# Patient Record
Sex: MALE | Race: WHITE | Employment: FULL TIME | URBAN - METROPOLITAN AREA
[De-identification: names, ages, dates, MRNs, and addresses within clinical notes are randomized per-mention and may not be internally consistent; named-entity substitution may affect disease eponyms.]

---

## 2017-03-01 ENCOUNTER — TELEPHONE (OUTPATIENT)
Dept: FAMILY MEDICINE CLINIC | Facility: CLINIC | Age: 27
End: 2017-03-01

## 2017-03-01 NOTE — TELEPHONE ENCOUNTER
Patient notified that he will first need to see Dr Manny Meza, she can than give him a referral if necessary.  Patient will schedule a appointment

## 2017-03-09 ENCOUNTER — OFFICE VISIT (OUTPATIENT)
Dept: FAMILY MEDICINE CLINIC | Facility: CLINIC | Age: 27
End: 2017-03-09

## 2017-03-09 VITALS
HEIGHT: 76 IN | SYSTOLIC BLOOD PRESSURE: 118 MMHG | TEMPERATURE: 98 F | RESPIRATION RATE: 16 BRPM | HEART RATE: 82 BPM | WEIGHT: 181 LBS | BODY MASS INDEX: 22.04 KG/M2 | DIASTOLIC BLOOD PRESSURE: 76 MMHG

## 2017-03-09 DIAGNOSIS — S85.309S: ICD-10-CM

## 2017-03-09 DIAGNOSIS — I83.813 VARICOSE VEINS WITH PAIN, BILATERAL: Primary | ICD-10-CM

## 2017-03-09 PROCEDURE — 99213 OFFICE O/P EST LOW 20 MIN: CPT | Performed by: FAMILY MEDICINE

## 2017-03-09 NOTE — PATIENT INSTRUCTIONS
Olivia Dockery you were seen for painful legs due to extensive varicose veins and probable venous insufficiency. See Dr. Kaleb Cifuentes for opinion. Watch your stress. See me after the surgeon if needed.    Take care, Dr. Leif Pride    Leg Swelling in Both Legs    Swel · If your healthcare provider says that your leg swelling is caused by venous insufficiency or varicose veins, don't sit or  one place for long periods of time. Take breaks and walk about every few hours. Brisk walking is a good exercise.  It helps Varicose veins may or may not cause symptoms.  If symptoms do occur, they can include:  · Legs that feel tired, achy, heavy, or itchy  · Leg muscle cramps  · Skin changes, such as discoloration, dryness, redness, or rash (in more severe cases, you may also Follow up with your healthcare provider, or as directed. If imaging tests were done, you’ll be told the results and if there are any new findings that affect your care.   When to seek medical advice  Call your healthcare provider right away if any of these

## 2017-03-09 NOTE — PROGRESS NOTES
Fiona Schmidt is a 28year old male. HPI:   Pt here with vein concerns in his legs. States vein swelling in legs and pain in legs in left leg more than right leg.   He has had vein surgery on his left leg and feels like his legs are swelling again more la clear  NECK: supple,no adenopathy,no bruits  LUNGS: clear to auscultation  CARDIO: RRR without murmur  GI: good BS's,no masses, HSM or tenderness  EXTREMITIES: no cyanosis, clubbing; edema of both legs with left worse with large varicose vein traversing th

## 2017-03-13 ENCOUNTER — TELEPHONE (OUTPATIENT)
Dept: FAMILY MEDICINE CLINIC | Facility: CLINIC | Age: 27
End: 2017-03-13

## 2017-03-13 DIAGNOSIS — I83.813 VARICOSE VEINS OF BOTH LOWER EXTREMITIES WITH PAIN: Primary | ICD-10-CM

## 2017-03-13 NOTE — TELEPHONE ENCOUNTER
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 She would like a referral to him and not a general surgeon.

## 2017-03-15 ENCOUNTER — APPOINTMENT (OUTPATIENT)
Dept: CT IMAGING | Age: 27
End: 2017-03-15
Attending: EMERGENCY MEDICINE
Payer: OTHER MISCELLANEOUS

## 2017-03-15 ENCOUNTER — HOSPITAL ENCOUNTER (EMERGENCY)
Age: 27
Discharge: HOME | End: 2017-03-15
Attending: EMERGENCY MEDICINE
Payer: OTHER MISCELLANEOUS

## 2017-03-15 VITALS
DIASTOLIC BLOOD PRESSURE: 78 MMHG | TEMPERATURE: 97 F | OXYGEN SATURATION: 99 % | BODY MASS INDEX: 21.92 KG/M2 | WEIGHT: 180 LBS | RESPIRATION RATE: 16 BRPM | SYSTOLIC BLOOD PRESSURE: 133 MMHG | HEART RATE: 65 BPM | HEIGHT: 76 IN

## 2017-03-15 DIAGNOSIS — S00.03XA CONTUSION OF SCALP, INITIAL ENCOUNTER: ICD-10-CM

## 2017-03-15 DIAGNOSIS — S06.0X0A CONCUSSION WITH NO LOSS OF CONSCIOUSNESS, INITIAL ENCOUNTER: Primary | ICD-10-CM

## 2017-03-15 PROCEDURE — 99284 EMERGENCY DEPT VISIT MOD MDM: CPT

## 2017-03-15 PROCEDURE — 70450 CT HEAD/BRAIN W/O DYE: CPT

## 2017-03-15 RX ORDER — IBUPROFEN 600 MG/1
TABLET ORAL
Status: DISCONTINUED
Start: 2017-03-15 | End: 2017-03-15

## 2017-03-15 RX ORDER — IBUPROFEN 600 MG/1
600 TABLET ORAL ONCE
Status: COMPLETED | OUTPATIENT
Start: 2017-03-15 | End: 2017-03-15

## 2017-03-15 RX ORDER — ONDANSETRON 8 MG/1
8 TABLET, ORALLY DISINTEGRATING ORAL EVERY 6 HOURS PRN
Qty: 10 TABLET | Refills: 0 | Status: SHIPPED | OUTPATIENT
Start: 2017-03-15 | End: 2017-03-22

## 2017-03-15 NOTE — ED PROVIDER NOTES
Patient Seen in: [facility] Emergency Department In [place]    History   Patient presents with:  Head Neck Injury (neurologic, musculoskeletal)  Dizziness (neurologic)    Stated Complaint: HIT HEAD YESTERDAY WHILE AT WORK. NO LOC.   PT WITH CONTINUES HEAD Hypertension Maternal Grandfather    • Heart Disorder Paternal Grandmother    • Heart Disorder Paternal Grandfather          Smoking Status: Former Smoker                   Packs/Day: 0.00  Years:           Quit date: 11/05/2011    Smokeless Status: Never extremities with good strength and coordination. Gait is normal       ED Course   Labs Reviewed - No data to display    MDM   Patient has suffered a contusion to the head with postconcussive syndrome.       CT brain shows no acute intracranial abnormality

## 2017-03-16 ENCOUNTER — TELEPHONE (OUTPATIENT)
Dept: FAMILY MEDICINE CLINIC | Facility: CLINIC | Age: 27
End: 2017-03-16

## 2017-03-18 ENCOUNTER — OFFICE VISIT (OUTPATIENT)
Dept: FAMILY MEDICINE CLINIC | Facility: CLINIC | Age: 27
End: 2017-03-18

## 2017-03-18 VITALS
RESPIRATION RATE: 12 BRPM | OXYGEN SATURATION: 99 % | DIASTOLIC BLOOD PRESSURE: 70 MMHG | BODY MASS INDEX: 21.55 KG/M2 | WEIGHT: 177 LBS | SYSTOLIC BLOOD PRESSURE: 118 MMHG | HEIGHT: 76 IN | HEART RATE: 69 BPM | TEMPERATURE: 98 F

## 2017-03-18 DIAGNOSIS — S09.90XA HEAD INJURY, INITIAL ENCOUNTER: Primary | ICD-10-CM

## 2017-03-18 PROCEDURE — 99214 OFFICE O/P EST MOD 30 MIN: CPT | Performed by: FAMILY MEDICINE

## 2017-03-21 NOTE — PROGRESS NOTES
HPI:    Patient ID: Chris Whitley is a 28year old male. HPI  Patient is here for follow-up of ER visit for concussion. He was working and he bent forward at work and hit a metal object near the front and top of his head. No open wound.   He did feel hepatosplenomegaly, no abnormal aortic pulsation. MS:  No paraspinal or spinal tenderness, negative straight leg raise bilaterally, no joint swelling or tenderness, normal strength, range of motion and sensation in all four extremities.   Neurologic:  aler

## 2017-04-04 ENCOUNTER — TELEPHONE (OUTPATIENT)
Dept: FAMILY MEDICINE CLINIC | Facility: CLINIC | Age: 27
End: 2017-04-04

## 2017-04-04 DIAGNOSIS — I82.90 THROMBUS: Primary | ICD-10-CM

## 2017-04-04 NOTE — TELEPHONE ENCOUNTER
Dr. Van Lei called for pt --acute thrombus left leg found at past surgical site and he is starting him on Xarelto today at 10mg and he will need follow up.  RX added to med list today--Dr. Van Lei will send his reports to me as well-- Dr. Priscilla Perdue

## 2017-04-05 DIAGNOSIS — I82.90 THROMBUS: Primary | ICD-10-CM

## 2017-04-05 NOTE — TELEPHONE ENCOUNTER
Luis De La Torre said when she spoke to Dr Isael Valenzuela yesterday she told lilia the concussion was over 4 weeks ago and he said that should be OK to start the [address] [place] [address].  When she got home she relixed it had only been 3 weeks and she wants to make sure it is OK that he start the

## 2017-04-12 ENCOUNTER — HOSPITAL ENCOUNTER (EMERGENCY)
Age: 27
Discharge: HOME | End: 2017-04-12
Attending: EMERGENCY MEDICINE
Payer: COMMERCIAL

## 2017-04-12 ENCOUNTER — APPOINTMENT (OUTPATIENT)
Dept: CT IMAGING | Age: 27
End: 2017-04-12
Attending: EMERGENCY MEDICINE
Payer: COMMERCIAL

## 2017-04-12 VITALS
HEART RATE: 70 BPM | SYSTOLIC BLOOD PRESSURE: 130 MMHG | WEIGHT: 170 LBS | RESPIRATION RATE: 18 BRPM | TEMPERATURE: 98 F | OXYGEN SATURATION: 99 % | BODY MASS INDEX: 21 KG/M2 | DIASTOLIC BLOOD PRESSURE: 80 MMHG

## 2017-04-12 DIAGNOSIS — R91.1 PULMONARY NODULE: ICD-10-CM

## 2017-04-12 DIAGNOSIS — R07.89 CHEST PAIN, NON-CARDIAC: Primary | ICD-10-CM

## 2017-04-12 PROCEDURE — 99285 EMERGENCY DEPT VISIT HI MDM: CPT

## 2017-04-12 PROCEDURE — 36415 COLL VENOUS BLD VENIPUNCTURE: CPT

## 2017-04-12 PROCEDURE — 71275 CT ANGIOGRAPHY CHEST: CPT

## 2017-04-12 PROCEDURE — 80053 COMPREHEN METABOLIC PANEL: CPT | Performed by: EMERGENCY MEDICINE

## 2017-04-12 PROCEDURE — 85610 PROTHROMBIN TIME: CPT | Performed by: EMERGENCY MEDICINE

## 2017-04-12 PROCEDURE — 85730 THROMBOPLASTIN TIME PARTIAL: CPT | Performed by: EMERGENCY MEDICINE

## 2017-04-12 PROCEDURE — 84484 ASSAY OF TROPONIN QUANT: CPT | Performed by: EMERGENCY MEDICINE

## 2017-04-12 PROCEDURE — 93005 ELECTROCARDIOGRAM TRACING: CPT

## 2017-04-12 PROCEDURE — 85025 COMPLETE CBC W/AUTO DIFF WBC: CPT | Performed by: EMERGENCY MEDICINE

## 2017-04-12 PROCEDURE — 93010 ELECTROCARDIOGRAM REPORT: CPT

## 2017-04-12 PROCEDURE — 81003 URINALYSIS AUTO W/O SCOPE: CPT | Performed by: EMERGENCY MEDICINE

## 2017-04-12 NOTE — ED PROVIDER NOTES
Patient Seen in: [facility] Emergency Department In [place]    History   Patient presents with:  Chest Pain Angina (cardiovascular)    Stated Complaint: chest pain/leg pain/confirmed DVT    HPI    The patient is a 23-year-old male who is diagnosed about 1 Smoking Status: Former Smoker                   Packs/Day: 0.00  Years:           Quit date: 11/05/2011    Smokeless Status: Never Used                        Alcohol Use: Yes                Comment: 1-2 drinks/week      Review of Systems    Positi Course     Labs Reviewed   PROTHROMBIN TIME (PT) - Abnormal; Notable for the following:     PT 14.8 (*)     INR 1.19 (*)     All other components within normal limits   URINALYSIS WITH CULTURE REFLEX - Abnormal; Notable for the following:     Ketones Urine techniques were used. Dose information is transmitted to the ACR [employer] of Radiology) NRDR ([address]) which includes the Dose Index Registry.   PATIENT STATED HISTORY:(As transcribed by Technologist)  Patient complain of s pulmonary nodule. He will also follow up with his vascular surgeon as an outpatient. He felt comfortable with this plan and he was discharged home in stable condition.       Disposition and Plan     Clinical Impression:  Chest pain, non-cardiac  (primary

## 2017-04-12 NOTE — ED INITIAL ASSESSMENT (HPI)
Sob that started while he was at works states that he had to sit down and became dizzy.  Has confirmed blood clot in left leg and is being treated on xarelto

## 2017-04-13 ENCOUNTER — TELEPHONE (OUTPATIENT)
Dept: FAMILY MEDICINE CLINIC | Facility: CLINIC | Age: 27
End: 2017-04-13

## 2017-05-05 ENCOUNTER — TELEPHONE (OUTPATIENT)
Dept: FAMILY MEDICINE CLINIC | Facility: CLINIC | Age: 27
End: 2017-05-05

## 2017-05-05 NOTE — TELEPHONE ENCOUNTER
Received above message from patients mother. I looked at Dr Polina Platt note from yesterday and he ordered labs and I believe he wants patient to get labs done and than to follow up with you.

## 2017-05-05 NOTE — TELEPHONE ENCOUNTER
Please call pt directly --he is 28 yr old. Mom does not need to be involved unless requested by pt. Advise pt labs were ordered and I will not be ordering additional labs. Pt still needs OV with me as well. Call pt not his mom.  Dr. Devin Stratton

## 2017-05-06 NOTE — TELEPHONE ENCOUNTER
Left message on pt's vm asking him to [facility] regarding labs.   Await CB

## 2017-05-08 ENCOUNTER — MYAURORA ACCOUNT LINK (OUTPATIENT)
Dept: OTHER | Age: 27
End: 2017-05-08

## 2017-05-08 ENCOUNTER — HOSPITAL ENCOUNTER (OUTPATIENT)
Dept: CV DIAGNOSTICS | Age: 27
Discharge: HOME | End: 2017-05-08
Attending: SURGERY
Payer: COMMERCIAL

## 2017-05-08 ENCOUNTER — APPOINTMENT (OUTPATIENT)
Dept: LAB | Age: 27
End: 2017-05-08
Attending: SURGERY
Payer: COMMERCIAL

## 2017-05-08 DIAGNOSIS — R01.1 CARDIAC MURMUR: ICD-10-CM

## 2017-05-08 DIAGNOSIS — I80.202 DEEP VEIN THROMBOPHLEBITIS OF LEFT LEG (HCC): ICD-10-CM

## 2017-05-08 PROCEDURE — 85307 ASSAY ACTIVATED PROTEIN C: CPT

## 2017-05-08 PROCEDURE — 85613 RUSSELL VIPER VENOM DILUTED: CPT

## 2017-05-08 PROCEDURE — 36415 COLL VENOUS BLD VENIPUNCTURE: CPT

## 2017-05-08 PROCEDURE — 81241 F5 GENE: CPT

## 2017-05-08 PROCEDURE — 85306 CLOT INHIBIT PROT S FREE: CPT

## 2017-05-08 PROCEDURE — 86147 CARDIOLIPIN ANTIBODY EA IG: CPT

## 2017-05-08 PROCEDURE — 85705 THROMBOPLASTIN INHIBITION: CPT

## 2017-05-08 PROCEDURE — 85670 THROMBIN TIME PLASMA: CPT

## 2017-05-08 PROCEDURE — 85732 THROMBOPLASTIN TIME PARTIAL: CPT

## 2017-05-08 NOTE — TELEPHONE ENCOUNTER
I spoke to patient and told him that Dr Chele Willis has ordered the labs and he needs to schedule a appointment with Dr Kizzy Penaloza. Patient transferred to  to schedule appointment.

## 2017-05-15 ENCOUNTER — OFFICE VISIT (OUTPATIENT)
Dept: FAMILY MEDICINE CLINIC | Facility: CLINIC | Age: 27
End: 2017-05-15

## 2017-05-15 VITALS
HEIGHT: 74 IN | RESPIRATION RATE: 16 BRPM | SYSTOLIC BLOOD PRESSURE: 122 MMHG | DIASTOLIC BLOOD PRESSURE: 78 MMHG | WEIGHT: 179 LBS | BODY MASS INDEX: 22.97 KG/M2 | HEART RATE: 86 BPM | TEMPERATURE: 98 F

## 2017-05-15 DIAGNOSIS — I83.90 VARICOSE VEIN OF LEG: ICD-10-CM

## 2017-05-15 DIAGNOSIS — I34.1 MITRAL VALVE PROLAPSE: ICD-10-CM

## 2017-05-15 DIAGNOSIS — I82.492 ACUTE DEEP VEIN THROMBOSIS (DVT) OF OTHER SPECIFIED VEIN OF LEFT LOWER EXTREMITY (HCC): Primary | ICD-10-CM

## 2017-05-15 DIAGNOSIS — K64.8 INTERNAL HEMORRHOID: ICD-10-CM

## 2017-05-15 DIAGNOSIS — Z79.01 ON CONTINUOUS ORAL ANTICOAGULATION: ICD-10-CM

## 2017-05-15 DIAGNOSIS — S85.302S: ICD-10-CM

## 2017-05-15 DIAGNOSIS — R91.1 PULMONARY NODULE: ICD-10-CM

## 2017-05-15 DIAGNOSIS — D69.6 THROMBOCYTOPENIA, UNSPECIFIED (HCC): ICD-10-CM

## 2017-05-15 PROCEDURE — 99214 OFFICE O/P EST MOD 30 MIN: CPT | Performed by: FAMILY MEDICINE

## 2017-05-15 NOTE — PROGRESS NOTES
Ruba Rodriguez is a 28year old male. HPI:   Pt referred to Dr. Xochitl Snow for vein concerns and was seen on 4/4/17 and diagnosed with a left lower leg DVT and placed on Xarelto BID.  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 Joon Rodríguez HPI notes above for further details  SKIN: denies any unusual skin lesions or rashes  RESPIRATORY: denies shortness of breath with exertion currently but was in ER hemorrhoids internal evaluation and banding if appropriate. He will return to see Dr. Ami Ruffin as well as scheduled. He will also see Dr. Juventino Olivo for his platelet issues.  He is due to repeat another CT chest to assess his pulmonary nodule and this was ordered f

## 2017-05-15 NOTE — PATIENT INSTRUCTIONS
Rebekah Sanchez you were seen for recently diagnosed DVT and review of recent labs and test results. Your labs are all normal. See  for cardiology overview. See Dr. Jeanne Barthel for the hemorrhoids internal evaluation. See Dr. Ríos Likes as well as scheduled.    Ta · You were likely prescribed blood thinners (anticoagulants). They may be given as pills (oral) or shots (injections). Follow all instructions when using these medicines.  Note: Do not take blood thinners with other medicines, herbal remedies, or supplement The term venous thromboembolism (VTE) is used to describe two conditions, deep vein thrombosis (DVT) and pulmonary embolism (PE). Homa Pelayo use the term VTE because the two conditions are very closely related, and because their prevention and treatment are clos If you are prescribed warfarin, it is important that you have regular blood tests as prescribed. Make sure you keep all appointments for lab tests, so that your healthcare provider knows whether to adjust your dosage.  If you don't, you may not be getting t · Coughing up blood  · Nose bleeds  · Bleeding gums  · Bleeding from a cut that will not stop  · Vaginal bleeding  Call 911  Call 911 if you have the following symptoms. They may mean a blood clot in the lungs.   · Chest pain  · Trouble breathing  · Fast he

## 2017-05-18 ENCOUNTER — PRIOR ORIGINAL RECORDS (OUTPATIENT)
Dept: OTHER | Age: 27
End: 2017-05-18

## 2017-05-31 NOTE — TELEPHONE ENCOUNTER
Patient is ready to see a psychiatrist, he has IHP, no referral is needed, he just calls the number on the back oh his insurance card. Unless you want him to go through [facility]?

## 2017-05-31 NOTE — TELEPHONE ENCOUNTER
I spoke to patient and told him to be expecting a call from [facility] in the next 24-48 hours.  He verbalizes understanding

## 2017-06-01 ENCOUNTER — TELEPHONE (OUTPATIENT)
Dept: FAMILY MEDICINE CLINIC | Facility: CLINIC | Age: 27
End: 2017-06-01

## 2017-06-07 ENCOUNTER — TELEPHONE (OUTPATIENT)
Dept: FAMILY MEDICINE CLINIC | Facility: CLINIC | Age: 27
End: 2017-06-07

## 2017-06-07 NOTE — TELEPHONE ENCOUNTER
Pt states that he saw Frieda Stewart, the counselor which wanted to put him on a medication that will conflict with a medication he is already on.

## 2017-06-08 ENCOUNTER — TELEPHONE (OUTPATIENT)
Dept: FAMILY MEDICINE CLINIC | Facility: CLINIC | Age: 27
End: 2017-06-08

## 2017-06-08 NOTE — TELEPHONE ENCOUNTER
Patients Mom called yesterday right before we closed, I spoke to her now, she is calling about [employer] and his depression/anxiety medication.  She said he has not been on the Lexapro in over 2 years, the pharmacist told him he cannot take this medication while

## 2017-06-12 PROBLEM — I82.419 DVT OF DEEP FEMORAL VEIN (HCC): Status: ACTIVE | Noted: 2017-06-12

## 2017-06-12 NOTE — PATIENT INSTRUCTIONS
Bam Han you were seen for management of anxiety--use the Xanax as needed up to twice a day with care and caution. See Frieda Stewart for counseling and help. Keep seeing Dr. Mitul Gomes and have him fill out your disability papers. Stay on the [address] [place] [address].   He will also ord · Notice how your body reacts to stress. Learn to listen to your body signals. This will help you take action before the stress becomes severe. · When you can, do something about the source of your stress.  (Avoid hassles, limit the amount of change that h Normal anxiety is part of the body’s natural defense system.  It's an alert to a threat that is unknown, vague, or comes from your own internal fears. While you’re in this state, your feelings can range from a vague sense of worry to physical sensations suc Anxiety can be treated  The good news is that the anxiety that’s disrupting your life can be treated. Working with your doctor or other healthcare provider, you can develop skills to help you cope with anxiety.  You can also gain the perspective you need to

## 2017-08-31 ENCOUNTER — TELEPHONE (OUTPATIENT)
Dept: FAMILY MEDICINE CLINIC | Facility: CLINIC | Age: 27
End: 2017-08-31

## 2017-08-31 ENCOUNTER — OFFICE VISIT (OUTPATIENT)
Dept: FAMILY MEDICINE CLINIC | Facility: CLINIC | Age: 27
End: 2017-08-31

## 2017-08-31 VITALS
BODY MASS INDEX: 21.55 KG/M2 | OXYGEN SATURATION: 99 % | SYSTOLIC BLOOD PRESSURE: 118 MMHG | DIASTOLIC BLOOD PRESSURE: 76 MMHG | RESPIRATION RATE: 16 BRPM | WEIGHT: 177 LBS | HEART RATE: 82 BPM | HEIGHT: 76 IN

## 2017-08-31 DIAGNOSIS — S39.011A ABDOMINAL WALL STRAIN, INITIAL ENCOUNTER: ICD-10-CM

## 2017-08-31 DIAGNOSIS — M62.838 SPASM OF ABDOMINAL MUSCLES OF LEFT SIDE: ICD-10-CM

## 2017-08-31 DIAGNOSIS — R10.32 ABDOMINAL WALL PAIN IN LEFT LOWER QUADRANT: Primary | ICD-10-CM

## 2017-08-31 PROCEDURE — 99213 OFFICE O/P EST LOW 20 MIN: CPT | Performed by: FAMILY MEDICINE

## 2017-08-31 RX ORDER — METAXALONE 800 MG/1
TABLET ORAL
Qty: 30 TABLET | Refills: 0 | Status: SHIPPED | OUTPATIENT
Start: 2017-08-31 | End: 2017-10-30

## 2017-08-31 NOTE — PROGRESS NOTES
Gaurang Pina is a 28year old male. HPI:   Pt called to be seen a few hours ago with new left lower abdominal wall pains getting worse. He has had chronic DVT and was just cleared this past week by Dr. Heather Gomez to start exercising again.   He states a few d on exertion  GI: over did it he states with exercise and now lower mainly left sided abdominal wall muscle pain and denies heartburn; no nausea or vomiting or diarrhea; no blood in urine or stool; no trauma to abdominal wall  NEURO: denies headaches  HEME: encounter  -     OP REFERRAL TO [facility]  -     Metaxalone 800 MG Oral Tab; Take one half to a full tab nightly PRN    Spasm of abdominal muscles of left side  -     Metaxalone 800 MG Oral Tab;  Take one half to a full tab nightly PRN

## 2017-08-31 NOTE — TELEPHONE ENCOUNTER
Pt heard and felt a popping sensation in his abd while working out he states the area is burning, hard and swelling.  Pt goes to work at [employer], he would like to be seen this am

## 2017-08-31 NOTE — PATIENT INSTRUCTIONS
Abdominal Pain    Abdominal pain is pain in the stomach or belly area. Everyone has this pain from time to time. In many cases it goes away on its own. But abdominal pain can sometimes be due to a serious problem, such as appendicitis.  So it’s important If you have vomiting or diarrhea, sip water or other clear fluids. When you are ready to eat solid foods again, start with small amounts of easy-to-digest, low-fat foods. These include apple sauce, toast, or crackers.    When to seek medical care  Call [phone] A muscle spasm is a sudden tightening of the muscle you can’t control. This may be caused by strain, overworking the muscle, or injury. It can also be caused by dehydration, electrolyte imbalance, diabetes, alcohol use, and certain medicines.  If it goes on

## 2017-09-11 ENCOUNTER — OFFICE VISIT (OUTPATIENT)
Dept: PHYSICAL THERAPY | Age: 27
End: 2017-09-11
Attending: NURSE PRACTITIONER
Payer: COMMERCIAL

## 2017-09-11 DIAGNOSIS — R10.32 ABDOMINAL WALL PAIN IN LEFT LOWER QUADRANT: ICD-10-CM

## 2017-09-11 DIAGNOSIS — S39.011A ABDOMINAL WALL STRAIN, INITIAL ENCOUNTER: ICD-10-CM

## 2017-09-11 PROCEDURE — 97530 THERAPEUTIC ACTIVITIES: CPT | Performed by: PHYSICAL THERAPIST

## 2017-09-11 PROCEDURE — 97161 PT EVAL LOW COMPLEX 20 MIN: CPT | Performed by: PHYSICAL THERAPIST

## 2017-09-11 NOTE — PROGRESS NOTES
SPINE EVALUATION:   Referring Physician: Dr. Jarrett Pearce  Diagnosis: Abdominal wall strain left      Date of Service: 9/11/2017     PATIENT Anjana Garza is a 28year old y/o male who presents to therapy today with complaints of left side abdomin lower tibia  Extension: WNL  Sidebending: R WNL; L WNL  Mild left abdominal pain with right SB   Rotation: R WNL; L WNL    Accessory motion: Good mobility lumbar spine.  Mild left side lumbar pain with unilateral PAS lower lumbar spine   Palpation: No palpa in 4 weeks   3) The patient will be able to perform ADLS without difficulty in 4 weeks   4) FOTO of 79/100 in 4 weeks     Frequency / Duration: Patient will be seen for 2 x/week or a total of 8 visits over a 90 day period.  Treatment will include: Manual Th

## 2017-09-13 ENCOUNTER — OFFICE VISIT (OUTPATIENT)
Dept: PHYSICAL THERAPY | Age: 27
End: 2017-09-13
Attending: NURSE PRACTITIONER
Payer: COMMERCIAL

## 2017-09-13 PROCEDURE — 97112 NEUROMUSCULAR REEDUCATION: CPT | Performed by: PHYSICAL THERAPIST

## 2017-09-13 PROCEDURE — 97110 THERAPEUTIC EXERCISES: CPT | Performed by: PHYSICAL THERAPIST

## 2017-09-13 NOTE — PROGRESS NOTES
Dx: Abdominal pain left lower quadrant, strain         Authorized # of Visits:  8         Next MD visit: none scheduled  Fall Risk: standard         Precautions: n/a             Subjective: The patient states he is feeling better.      Objective: Continued

## 2017-09-14 ENCOUNTER — APPOINTMENT (OUTPATIENT)
Dept: PHYSICAL THERAPY | Age: 27
End: 2017-09-14
Attending: NURSE PRACTITIONER
Payer: COMMERCIAL

## 2017-09-19 ENCOUNTER — OFFICE VISIT (OUTPATIENT)
Dept: PHYSICAL THERAPY | Age: 27
End: 2017-09-19
Attending: NURSE PRACTITIONER
Payer: COMMERCIAL

## 2017-09-19 PROCEDURE — 97112 NEUROMUSCULAR REEDUCATION: CPT | Performed by: PHYSICAL THERAPIST

## 2017-09-19 PROCEDURE — 97110 THERAPEUTIC EXERCISES: CPT | Performed by: PHYSICAL THERAPIST

## 2017-09-19 NOTE — PROGRESS NOTES
Dx: Abdominal pain left lower quadrant, strain         Authorized # of Visits:  8         Next MD visit: none scheduled  Fall Risk: standard         Precautions: n/a             Subjective: The patient states the abdominals continue to get better.  He does Quadruped rock back  High knees with same side reach 10 feet x 1  each         Prone hip extension 15 reps each  Bridge 10 reps          Seated on SB anterior pelvic tilt             Charges:  TherEx (1), Neuro Re-ed (2)       Total Timed Treatment: 45 mi

## 2017-09-21 ENCOUNTER — OFFICE VISIT (OUTPATIENT)
Dept: PHYSICAL THERAPY | Age: 27
End: 2017-09-21
Attending: NURSE PRACTITIONER
Payer: COMMERCIAL

## 2017-09-21 PROCEDURE — 97112 NEUROMUSCULAR REEDUCATION: CPT | Performed by: PHYSICAL THERAPIST

## 2017-09-21 PROCEDURE — 97110 THERAPEUTIC EXERCISES: CPT | Performed by: PHYSICAL THERAPIST

## 2017-09-21 NOTE — PROGRESS NOTES
Dx: Abdominal pain left lower quadrant, strain         Authorized # of Visits:  8         Next MD visit: none scheduled  Fall Risk: standard         Precautions: n/a             Subjective: No pain today. Objective:  Made additions to HEP of single leg Single leg balance right and left LE x 3 10 seconds max  (HEP)       Quadruped rock back  High knees with same side reach 10 feet x 1  each  Seated HS with DF/PF 5 reps, 3 reps total (HEP)       Prone hip extension 15 reps each  Bridge 10 reps  Gastroc/sol

## 2017-09-25 ENCOUNTER — APPOINTMENT (OUTPATIENT)
Dept: PHYSICAL THERAPY | Age: 27
End: 2017-09-25
Attending: NURSE PRACTITIONER
Payer: COMMERCIAL

## 2017-09-28 ENCOUNTER — TELEPHONE (OUTPATIENT)
Dept: FAMILY MEDICINE CLINIC | Facility: CLINIC | Age: 27
End: 2017-09-28

## 2017-09-28 ENCOUNTER — OFFICE VISIT (OUTPATIENT)
Dept: PHYSICAL THERAPY | Age: 27
End: 2017-09-28
Attending: NURSE PRACTITIONER
Payer: COMMERCIAL

## 2017-09-28 PROCEDURE — 97140 MANUAL THERAPY 1/> REGIONS: CPT | Performed by: PHYSICAL THERAPIST

## 2017-09-28 PROCEDURE — 97110 THERAPEUTIC EXERCISES: CPT | Performed by: PHYSICAL THERAPIST

## 2017-09-28 NOTE — PROGRESS NOTES
Dx: Abdominal pain left lower quadrant, strain         Authorized # of Visits:  8         Next MD visit: none scheduled  Fall Risk: standard         Precautions: n/a             Subjective:  The patient states that he strained his abdominals a little bit la kneel hip flexor stretch right and left       1/2 kneel  Plank on hands x 4 parallel hand x 2, offset right and left x 1  Quadruped rock back tactile cues through hips  1/2 kneel transverse plane isometrics 10 each      Hip/HS mobility on step 20 each  Pro

## 2017-09-28 NOTE — TELEPHONE ENCOUNTER
Patient states that he has been going through physical therapy. He states that he does have pain in the groin/testicle area and feels as if there is a lump in the groin. Patient would like an order for CT/US to rule out a possible hernia.

## 2017-09-29 NOTE — TELEPHONE ENCOUNTER
Please call pt and let him know that  wont be back until weds and he can wait until she is back in the office or he can schedule an appt with  to get an order for CT/US

## 2017-10-02 ENCOUNTER — OFFICE VISIT (OUTPATIENT)
Dept: FAMILY MEDICINE CLINIC | Facility: CLINIC | Age: 27
End: 2017-10-02

## 2017-10-02 ENCOUNTER — APPOINTMENT (OUTPATIENT)
Dept: PHYSICAL THERAPY | Age: 27
End: 2017-10-02
Attending: NURSE PRACTITIONER
Payer: COMMERCIAL

## 2017-10-02 VITALS
BODY MASS INDEX: 21.27 KG/M2 | RESPIRATION RATE: 16 BRPM | HEART RATE: 72 BPM | WEIGHT: 174.63 LBS | TEMPERATURE: 98 F | HEIGHT: 76 IN | DIASTOLIC BLOOD PRESSURE: 70 MMHG | SYSTOLIC BLOOD PRESSURE: 110 MMHG

## 2017-10-02 DIAGNOSIS — N50.812 TESTICULAR PAIN, LEFT: ICD-10-CM

## 2017-10-02 DIAGNOSIS — R10.32 LEFT LOWER QUADRANT PAIN: Primary | ICD-10-CM

## 2017-10-02 PROCEDURE — 99214 OFFICE O/P EST MOD 30 MIN: CPT | Performed by: FAMILY MEDICINE

## 2017-10-02 NOTE — PROGRESS NOTES
HPI:    Patient ID: Sallyanne Soulier is a 28year old male. HPI  Patient states of having pain in the left lower quadrant abdominal wall and left groin and then now on left testicle area. No penile discharge. No pain with urination.   He does  do exercis quadrant pain  (primary encounter diagnosis)  Testicular pain, left  There is suspicion of hernia in the left inguinal region. Will check ultrasound and also of scrotal area due to testicular pain. Will call results.   No orders of the defined types were

## 2017-10-03 ENCOUNTER — HOSPITAL ENCOUNTER (OUTPATIENT)
Dept: ULTRASOUND IMAGING | Age: 27
Discharge: HOME | End: 2017-10-03
Attending: FAMILY MEDICINE
Payer: COMMERCIAL

## 2017-10-03 DIAGNOSIS — R10.32 LEFT LOWER QUADRANT PAIN: ICD-10-CM

## 2017-10-03 DIAGNOSIS — N50.812 TESTICULAR PAIN, LEFT: ICD-10-CM

## 2017-10-03 PROCEDURE — 76705 ECHO EXAM OF ABDOMEN: CPT | Performed by: FAMILY MEDICINE

## 2017-10-03 PROCEDURE — 76870 US EXAM SCROTUM: CPT | Performed by: FAMILY MEDICINE

## 2017-10-03 PROCEDURE — 93975 VASCULAR STUDY: CPT | Performed by: FAMILY MEDICINE

## 2017-10-04 ENCOUNTER — TELEPHONE (OUTPATIENT)
Dept: FAMILY MEDICINE CLINIC | Facility: CLINIC | Age: 27
End: 2017-10-04

## 2017-10-05 ENCOUNTER — APPOINTMENT (OUTPATIENT)
Dept: PHYSICAL THERAPY | Age: 27
End: 2017-10-05
Attending: NURSE PRACTITIONER
Payer: COMMERCIAL

## 2017-10-05 NOTE — TELEPHONE ENCOUNTER
For ultrasound of scrotum conclusion says moderate left varicocele but not much mentioned in the description. Could radiology make an addendum for that. Please check with radiology.

## 2017-10-09 ENCOUNTER — TELEPHONE (OUTPATIENT)
Dept: FAMILY MEDICINE CLINIC | Facility: CLINIC | Age: 27
End: 2017-10-09

## 2017-10-09 DIAGNOSIS — I86.1 VARICOCELE: Primary | ICD-10-CM

## 2017-10-09 NOTE — TELEPHONE ENCOUNTER
Patient was notified a referral has been placed to see urologist. Patient verbalized understanding. Patient mother was also called and notified order has been placed. She verbalized understanding.

## 2017-10-09 NOTE — TELEPHONE ENCOUNTER
Patient was notified of test results as well as mother, they both verbalized understanding, info give for [facility] urologist for Dr. Lindsay Lyles since they wanted a doctor close to home.

## 2017-10-30 ENCOUNTER — TELEPHONE (OUTPATIENT)
Dept: FAMILY MEDICINE CLINIC | Facility: CLINIC | Age: 27
End: 2017-10-30

## 2017-10-30 DIAGNOSIS — S85.302S: Primary | ICD-10-CM

## 2017-10-30 DIAGNOSIS — Z79.01 ON CONTINUOUS ORAL ANTICOAGULATION: ICD-10-CM

## 2017-10-30 DIAGNOSIS — I82.412 DEEP VENOUS THROMBOSIS OF LEFT PROFUNDA FEMORIS VEIN (HCC): ICD-10-CM

## 2017-10-30 NOTE — TELEPHONE ENCOUNTER
Pt is requesting  to start filling xarelto. Dr. Yimi Munroe will no longer be refilling that specific medication.   Med pended and routed to PCP

## 2017-10-30 NOTE — TELEPHONE ENCOUNTER
Pharmacy calling in regards to script for Xarelto 20mg. Dr. Arie Reyes, surgeon, was physician who previously prescribed this medication. He will not prescribe this for patient any longer.  Pharmacy calling to see if Dr. Agustín Fisher will now prescribe the medication

## 2017-11-08 ENCOUNTER — OFFICE VISIT (OUTPATIENT)
Dept: FAMILY MEDICINE CLINIC | Facility: CLINIC | Age: 27
End: 2017-11-08

## 2017-11-08 VITALS
TEMPERATURE: 98 F | HEIGHT: 76 IN | WEIGHT: 177.19 LBS | HEART RATE: 66 BPM | SYSTOLIC BLOOD PRESSURE: 118 MMHG | BODY MASS INDEX: 21.58 KG/M2 | DIASTOLIC BLOOD PRESSURE: 76 MMHG | RESPIRATION RATE: 16 BRPM

## 2017-11-08 DIAGNOSIS — J02.9 SORE THROAT: Primary | ICD-10-CM

## 2017-11-08 DIAGNOSIS — J11.1 INFLUENZA-LIKE ILLNESS: ICD-10-CM

## 2017-11-08 PROCEDURE — 87798 DETECT AGENT NOS DNA AMP: CPT | Performed by: FAMILY MEDICINE

## 2017-11-08 PROCEDURE — 87502 INFLUENZA DNA AMP PROBE: CPT | Performed by: FAMILY MEDICINE

## 2017-11-08 PROCEDURE — 87880 STREP A ASSAY W/OPTIC: CPT | Performed by: FAMILY MEDICINE

## 2017-11-08 PROCEDURE — 99214 OFFICE O/P EST MOD 30 MIN: CPT | Performed by: FAMILY MEDICINE

## 2017-11-08 RX ORDER — BENZONATATE 100 MG/1
100 CAPSULE ORAL 3 TIMES DAILY PRN
Qty: 20 CAPSULE | Refills: 0 | Status: SHIPPED | OUTPATIENT
Start: 2017-11-08 | End: 2017-11-15

## 2017-11-08 NOTE — PROGRESS NOTES
HPI:   Chris Whitley is a 28year old male that presents for influenza like symptoms for 5 days. He had body aches, chills, and cough starting last week. No has runny nose, sore throat and sinus congestion. He has not taken his temperature.  He denies r care  - INFLUENZA A+B, RT PCR W/RFLX TO INFLUENZA H1N1; Future  - benzonatate 100 MG Oral Cap; Take 1 capsule (100 mg total) by mouth 3 (three) times daily as needed for cough. Dispense: 20 capsule;  Refill: 0  - note given for work, if + for flu will need

## 2017-11-08 NOTE — PATIENT INSTRUCTIONS
I will call you with flu test results tomorrow and we will determine further plan from there. Influenza (Adult)    Influenza is also called the flu. It is a viral illness that affects the air passages of your lungs.  It is different from the common co provider, or as advised, if you are not getting better over the next week. If you are age 72 or older, talk with your provider about getting a pneumococcal vaccine every 5 years. You should also get this vaccine if you have chronic asthma or COPD.  All brittany

## 2017-11-13 ENCOUNTER — TELEPHONE (OUTPATIENT)
Dept: FAMILY MEDICINE CLINIC | Facility: CLINIC | Age: 27
End: 2017-11-13

## 2017-11-13 NOTE — TELEPHONE ENCOUNTER
Patient states that he was told that his flu test was negative. He is still having symptoms and would like to know if he can possibly have an antibiotic.

## 2017-11-13 NOTE — TELEPHONE ENCOUNTER
Spoke to patient. Informed him no doctors in the office today but he was advised to go to the [facility] or he can come in tomorrow. Pt states he will go to [facility] tomorrow.

## 2017-11-14 ENCOUNTER — TELEPHONE (OUTPATIENT)
Dept: FAMILY MEDICINE CLINIC | Facility: CLINIC | Age: 27
End: 2017-11-14

## 2017-11-14 DIAGNOSIS — J06.9 ACUTE URI: Primary | ICD-10-CM

## 2017-11-14 RX ORDER — METHYLPREDNISOLONE 4 MG/1
TABLET ORAL
Qty: 1 KIT | Refills: 0 | Status: SHIPPED | OUTPATIENT
Start: 2017-11-14 | End: 2017-12-11

## 2017-11-14 RX ORDER — AMOXICILLIN 500 MG/1
500 CAPSULE ORAL 3 TIMES DAILY
Qty: 30 CAPSULE | Refills: 0 | Status: SHIPPED | OUTPATIENT
Start: 2017-11-14 | End: 2017-11-24

## 2017-11-14 NOTE — TELEPHONE ENCOUNTER
Left detailed message on pts number, verified per HIPAA stating abx and steroid pack was sent over to pharmacy and to give the office a call to schedule an appt.

## 2017-11-14 NOTE — TELEPHONE ENCOUNTER
Pt is requesting antibiotics - pt was told last week that if he was still sick to call the office and we will call in antibiotics.  He was offered three different appts today but he is to busy to come in

## 2017-11-14 NOTE — TELEPHONE ENCOUNTER
Call Jodie Armando and start him on Amoxil three times a day for ten days with a Medrol Dosepack and on my schedule he needs OV this week.  Dr. Rex Chery

## 2017-11-14 NOTE — TELEPHONE ENCOUNTER
Pt saw Dr. Chhaya Zamora on 11/8/2017 for sick visit. Benzonatate was given, was told to give the office a call if not better to get antibiotics.  is out of the office. Message routed to PCP.

## 2017-12-11 ENCOUNTER — HOSPITAL ENCOUNTER (EMERGENCY)
Age: 27
Discharge: HOME | End: 2017-12-12
Attending: EMERGENCY MEDICINE
Payer: COMMERCIAL

## 2017-12-11 DIAGNOSIS — B34.9 VIRAL SYNDROME: Primary | ICD-10-CM

## 2017-12-11 DIAGNOSIS — E86.0 DEHYDRATION: ICD-10-CM

## 2017-12-11 DIAGNOSIS — K40.20 BILATERAL INGUINAL HERNIA WITHOUT OBSTRUCTION OR GANGRENE, RECURRENCE NOT SPECIFIED: ICD-10-CM

## 2017-12-11 PROCEDURE — 99284 EMERGENCY DEPT VISIT MOD MDM: CPT

## 2017-12-11 PROCEDURE — 96360 HYDRATION IV INFUSION INIT: CPT

## 2017-12-11 RX ORDER — ALPRAZOLAM 0.25 MG/1
0.25 TABLET ORAL 4 TIMES DAILY PRN
COMMUNITY
End: 2018-03-06 | Stop reason: ALTCHOICE

## 2017-12-12 ENCOUNTER — APPOINTMENT (OUTPATIENT)
Dept: GENERAL RADIOLOGY | Age: 27
End: 2017-12-12
Attending: EMERGENCY MEDICINE
Payer: COMMERCIAL

## 2017-12-12 ENCOUNTER — TELEPHONE (OUTPATIENT)
Dept: FAMILY MEDICINE CLINIC | Facility: CLINIC | Age: 27
End: 2017-12-12

## 2017-12-12 VITALS
SYSTOLIC BLOOD PRESSURE: 121 MMHG | OXYGEN SATURATION: 100 % | HEART RATE: 66 BPM | BODY MASS INDEX: 21 KG/M2 | TEMPERATURE: 98 F | RESPIRATION RATE: 16 BRPM | WEIGHT: 174 LBS | DIASTOLIC BLOOD PRESSURE: 73 MMHG

## 2017-12-12 DIAGNOSIS — K40.20 BILATERAL INGUINAL HERNIA WITHOUT OBSTRUCTION OR GANGRENE, RECURRENCE NOT SPECIFIED: Primary | ICD-10-CM

## 2017-12-12 PROCEDURE — 86403 PARTICLE AGGLUT ANTBDY SCRN: CPT | Performed by: EMERGENCY MEDICINE

## 2017-12-12 PROCEDURE — 80053 COMPREHEN METABOLIC PANEL: CPT | Performed by: EMERGENCY MEDICINE

## 2017-12-12 PROCEDURE — 71020 XR CHEST PA + LAT CHEST (CPT=71020): CPT | Performed by: EMERGENCY MEDICINE

## 2017-12-12 PROCEDURE — 85025 COMPLETE CBC W/AUTO DIFF WBC: CPT | Performed by: EMERGENCY MEDICINE

## 2017-12-12 NOTE — TELEPHONE ENCOUNTER
Call pt that referral placed for Dr. Marylou Diaz and advise pt to schedule appt--- Dr. Devin Stratton

## 2017-12-12 NOTE — ED PROVIDER NOTES
Patient Seen in: THE [facility] Emergency Department In [place]    History   Patient presents with:  Dizziness (neurologic)  Fatigue (constitutional, neurologic)  Headache (neurologic)    Stated Complaint: DIZZINESS/HEADACHE/FLUSHED FEELS WEAK    HPI    Patie rash.  HEENT: Tympanic membrane's, conjunctivae, throat normal.  No scleral icterus.   Oropharynx moist.  Neck: No meningismus or adenopathy  Lungs: Clear  Heart: Apical pulse 64 and regular without murmur rub  Abdomen: Soft and nontender without mass or [place] [address]  [phone]    In 1 week          Medications Prescribed:  Discharge Medication List as of 12/12/2017  1:03 AM

## 2017-12-12 NOTE — TELEPHONE ENCOUNTER
Patient seen in ER on 12/11/17 and dx with Bilateral inguinal hernia without obstruction or gangrene, recurrence not specified. Patient was recommended to f/u with Dr. Yadi Tellez. Requesting referral. Pended if okay.      Requesting Gen surgery referral   LOV:

## 2017-12-16 ENCOUNTER — OFFICE VISIT (OUTPATIENT)
Dept: FAMILY MEDICINE CLINIC | Facility: CLINIC | Age: 27
End: 2017-12-16

## 2017-12-16 VITALS
SYSTOLIC BLOOD PRESSURE: 118 MMHG | DIASTOLIC BLOOD PRESSURE: 80 MMHG | HEART RATE: 95 BPM | TEMPERATURE: 99 F | HEIGHT: 76 IN | RESPIRATION RATE: 20 BRPM | BODY MASS INDEX: 21.43 KG/M2 | WEIGHT: 176 LBS | OXYGEN SATURATION: 97 %

## 2017-12-16 DIAGNOSIS — J02.0 STREP PHARYNGITIS: Primary | ICD-10-CM

## 2017-12-16 DIAGNOSIS — J02.9 SORE THROAT: ICD-10-CM

## 2017-12-16 PROCEDURE — 99213 OFFICE O/P EST LOW 20 MIN: CPT | Performed by: FAMILY MEDICINE

## 2017-12-16 PROCEDURE — 87880 STREP A ASSAY W/OPTIC: CPT | Performed by: FAMILY MEDICINE

## 2017-12-16 RX ORDER — CEFDINIR 300 MG/1
300 CAPSULE ORAL 2 TIMES DAILY
Qty: 20 CAPSULE | Refills: 0 | Status: SHIPPED | OUTPATIENT
Start: 2017-12-16 | End: 2018-01-08

## 2017-12-16 NOTE — PATIENT INSTRUCTIONS
Pharyngitis: Strep (Confirmed)    You have had a positive test for strep throat. Strep throat is a contagious illness. It is spread by coughing, kissing or by touching others after touching your mouth or nose.  Symptoms include throat pain that is worse w · You can't swallow liquids or you can't open your mouth wide because of throat pain  · Signs of dehydration. These include very dark urine or no urine, sunken eyes, and dizziness.   · Trouble breathing or noisy breathing  · Muffled voice  · Rash  Date Last · Remember: unless a sore throat is caused by a bacterial infection, antibiotics won’t help you. Prevent future sore throats  Prevention tips include the following:  · Stop smoking or reduce contact with secondhand smoke.  Smoke irritates the tender throat

## 2017-12-16 NOTE — PROGRESS NOTES
Rj Herrera is a 28year old male. S:  Patient presents today with the following concerns:  · Symptoms on and off since November. Was seen at [facility] and had negative flu testing and RSV testing and negative rapid strep test 11/8/17.   Later was Heart Disorder Paternal Grandfather        REVIEW OF SYSTEMS:  GENERAL: fatigued  SKIN: denies any unusual skin lesions  EYES:denies vision change  LUNGS: denies shortness of breath with exertion  CARDIOVASCULAR: denies chest pain on exertion  GI: denies a

## 2018-01-03 NOTE — TELEPHONE ENCOUNTER
Requesting alprazolam  LOV: 12/16/17(acute); 6/12/17  RTC: 3 months  Last Relevant Labs: n/a  Filled: 6/12/17 #180 with 1 refills    Future Appointments  Date Time Provider Roque Lux   1/15/2018 2:30 PM Mari Hood MD [facility] General

## 2018-01-08 NOTE — PROGRESS NOTES
[facility] Internal Medicine Progress Note    CC:  Patient presents with:  Medication Request: xanax  Imm/Inj: declines flu shot      HPI:   HPI  Anxiety  Pt states he takes a half a tab a day  He states the pharmacy said his script   He s well-nourished, and in no distress. HENT:   Mouth/Throat: Oropharynx is clear and moist. No oropharyngeal exudate. Eyes: EOM are normal. Pupils are equal, round, and reactive to light.    Cardiovascular: Normal rate, regular rhythm and normal heart soun

## 2018-01-08 NOTE — PATIENT INSTRUCTIONS
Thank you for choosing [facility] C Pickens PA-C at [place] [address]  To Do: Juan Magana  1. Pt to continue medications as prescribed  2.  Follow-up in 6 months, sooner if problems  Effective 6/19/17 until November 2017  Due to [address] potential risk of harm or side effects or medication interactions.  It is your duty and for your safety to discuss with the pharmacist and our office with questions, and to notify us and stop treatment if problems arise, but know that our intention is that

## 2018-01-10 RX ORDER — ALPRAZOLAM 0.5 MG/1
TABLET ORAL
Qty: 180 TABLET | Refills: 0 | OUTPATIENT
Start: 2018-01-10

## 2018-01-10 NOTE — TELEPHONE ENCOUNTER
1st outreach to schedule ov for Anxiety follow up. Pt was in to see [facility] for Anxiety and refill on 1/08/18. Pt states he has already rec'd refill.

## 2018-01-10 NOTE — TELEPHONE ENCOUNTER
Medication Quantity Refills Start End   ALPRAZolam 0.5 MG Oral Tab 180 tablet 0 1/8/2018    Sig :  Take 1 tablet (0.5 mg total) by mouth 2 (two) times daily as needed for Sleep or Anxiety.      Route:   Oral     PRN Reason(s):   Sleep, Anxiety     Order #:

## 2018-01-15 ENCOUNTER — OFFICE VISIT (OUTPATIENT)
Dept: SURGERY | Facility: CLINIC | Age: 28
End: 2018-01-15

## 2018-01-15 VITALS
HEIGHT: 76 IN | DIASTOLIC BLOOD PRESSURE: 76 MMHG | TEMPERATURE: 98 F | BODY MASS INDEX: 22.04 KG/M2 | WEIGHT: 181 LBS | SYSTOLIC BLOOD PRESSURE: 117 MMHG | HEART RATE: 66 BPM

## 2018-01-15 DIAGNOSIS — Z79.01 ON CONTINUOUS ORAL ANTICOAGULATION: ICD-10-CM

## 2018-01-15 DIAGNOSIS — D69.6 THROMBOCYTOPENIA, UNSPECIFIED (HCC): ICD-10-CM

## 2018-01-15 DIAGNOSIS — S85.302S: ICD-10-CM

## 2018-01-15 DIAGNOSIS — I83.90 VARICOSE VEIN OF LEG: ICD-10-CM

## 2018-01-15 DIAGNOSIS — I34.1 MITRAL VALVE PROLAPSE: ICD-10-CM

## 2018-01-15 DIAGNOSIS — S76.212A STRAIN OF GROIN, LEFT, INITIAL ENCOUNTER: Primary | ICD-10-CM

## 2018-01-15 PROCEDURE — 99243 OFF/OP CNSLTJ NEW/EST LOW 30: CPT | Performed by: COLON & RECTAL SURGERY

## 2018-01-15 NOTE — H&P
New Patient Visit Note       Active Problems      1. Strain of groin, left, initial encounter    2. Thrombocytopenia, unspecified ([address]    3. Varicose vein of leg    4. Injury of saphenous vein of left lower extremity, sequela    5.  On continuous oral anti []  Belly Button    [x]  Left Groin   []  Prior Incision     Has the patient had the following tests (If yes, please provide an approximate date):  Test Name Yes/No Date   Colonoscopy yes    Barium Enema no    CT Abdomen no      I acted as a scribe in this Years: 0.00           Quit date: 11/5/2011    Smokeless tobacco: Never Used                        Alcohol use:  Yes                Comment: 1-2 drinks/week    Drug use: No            Other Topics            Concern  Caffeine Concern        Yes  Exercise person, place, and time. He appears well-developed and well-nourished. No distress. HENT:   Head: Normocephalic and atraumatic. Eyes: Conjunctivae and EOM are normal. No scleral icterus. Neck: Normal range of motion. Neck supple.    Cardiovascular: No larger after eating or when he was bloated. The patient has never had the bulge gets stuck or be unable to be reduced. It has never gotten red warm or swollen. The patient's past medical history is significant for bilateral varicose vein procedure.

## 2018-01-15 NOTE — PATIENT INSTRUCTIONS
This patient presents at the referral of his primary care provider, Dr. Kizzy Penaloza. The patient noticed a bulge in his left groin in October 2017. This was around the time the patient was getting more aggressive with his workout regimen.   The patient contin the area and presents to our office for repeat evaluation if the area fails to resolve after 6 months or if it enlarges in size at any time. I have no further follow-up scheduled with this patient.   He should contact our office with any questions or con

## 2018-03-06 ENCOUNTER — OFFICE VISIT (OUTPATIENT)
Dept: FAMILY MEDICINE CLINIC | Facility: CLINIC | Age: 28
End: 2018-03-06

## 2018-03-06 VITALS
OXYGEN SATURATION: 100 % | RESPIRATION RATE: 12 BRPM | HEART RATE: 69 BPM | TEMPERATURE: 98 F | BODY MASS INDEX: 21 KG/M2 | DIASTOLIC BLOOD PRESSURE: 68 MMHG | WEIGHT: 174 LBS | SYSTOLIC BLOOD PRESSURE: 118 MMHG

## 2018-03-06 DIAGNOSIS — I86.1 VARICOCELE: ICD-10-CM

## 2018-03-06 DIAGNOSIS — S76.212D GROIN STRAIN, LEFT, SUBSEQUENT ENCOUNTER: ICD-10-CM

## 2018-03-06 DIAGNOSIS — Z00.00 ROUTINE ADULT HEALTH MAINTENANCE: Primary | ICD-10-CM

## 2018-03-06 DIAGNOSIS — I83.93 VARICOSE VEINS OF BOTH LOWER EXTREMITIES: ICD-10-CM

## 2018-03-06 DIAGNOSIS — E55.9 VITAMIN D DEFICIENCY: ICD-10-CM

## 2018-03-06 PROCEDURE — 99213 OFFICE O/P EST LOW 20 MIN: CPT | Performed by: FAMILY MEDICINE

## 2018-03-06 PROCEDURE — 99395 PREV VISIT EST AGE 18-39: CPT | Performed by: FAMILY MEDICINE

## 2018-03-06 NOTE — PROGRESS NOTES
Maryam Roy is a 33year old male who presents for a complete physical exam.   HPI:   Pt complains varicose veins on both legs. He had laser surgery done by Dr. Clyde Fleischer previously.   Apparently Dr. Clyde Fleischer is not available anymore and thus he needs referral Packs/day: 0.00      Years: 0.00         Quit date: 11/5/2011  Smokeless tobacco: Never Used                      Alcohol use:  Yes              Comment: 1-2 drinks/week        REVIEW OF SYSTEMS:   GENERAL: normoactive BS, non-distended, non-tender to palpation, no HSM/masses/pulsations  MUSCULOSKELETAL: Back with normal AROM, no joint swelling, extremities x 4 with normal strength 5/5 and symmetric and with normal AROM/PROM.    EXTREMITIES: no cyanosis, clubb appropriate for age. The patient verbalizes understanding of these recommendations and agrees to the plan. We will check complete physical labs and vitamin D as he has a history of low vitamin D. Patient has history of peripheral vascular disease.

## 2018-04-09 ENCOUNTER — TELEPHONE (OUTPATIENT)
Dept: FAMILY MEDICINE CLINIC | Facility: CLINIC | Age: 28
End: 2018-04-09

## 2018-04-09 ENCOUNTER — HOSPITAL ENCOUNTER (OUTPATIENT)
Dept: ULTRASOUND IMAGING | Age: 28
Discharge: HOME | End: 2018-04-09
Attending: FAMILY MEDICINE
Payer: COMMERCIAL

## 2018-04-09 DIAGNOSIS — M79.662 PAIN OF LEFT CALF: ICD-10-CM

## 2018-04-09 DIAGNOSIS — M79.662 PAIN OF LEFT CALF: Primary | ICD-10-CM

## 2018-04-09 DIAGNOSIS — Z86.718 HISTORY OF DVT (DEEP VEIN THROMBOSIS): ICD-10-CM

## 2018-04-09 DIAGNOSIS — M79.89 SWOLLEN LEG: ICD-10-CM

## 2018-04-09 PROCEDURE — 93971 EXTREMITY STUDY: CPT | Performed by: FAMILY MEDICINE

## 2018-04-09 NOTE — TELEPHONE ENCOUNTER
HX of blood clot is left lower leg    PT states that he has been having pain in the back of his pain, +swelling, pts states that he has varicose veins also and is unsure if the pain is coming from them or if he, no redness, pt states area is warm to touch

## 2018-04-09 NOTE — TELEPHONE ENCOUNTER
Patient informed he needs to get u/s stat and is to call scheduling now. Given information. He will contact scheduling to get done today. Order entered.

## 2018-04-11 ENCOUNTER — OFFICE VISIT (OUTPATIENT)
Dept: FAMILY MEDICINE CLINIC | Facility: CLINIC | Age: 28
End: 2018-04-11

## 2018-04-11 VITALS
WEIGHT: 174.81 LBS | HEART RATE: 66 BPM | SYSTOLIC BLOOD PRESSURE: 118 MMHG | DIASTOLIC BLOOD PRESSURE: 68 MMHG | HEIGHT: 76 IN | BODY MASS INDEX: 21.29 KG/M2 | RESPIRATION RATE: 14 BRPM | TEMPERATURE: 98 F

## 2018-04-11 DIAGNOSIS — R59.9 ENLARGEMENT OF LYMPH NODE: Primary | ICD-10-CM

## 2018-04-11 DIAGNOSIS — R10.30 INGUINAL PAIN, UNSPECIFIED LATERALITY: ICD-10-CM

## 2018-04-11 PROCEDURE — 99214 OFFICE O/P EST MOD 30 MIN: CPT | Performed by: FAMILY MEDICINE

## 2018-04-11 NOTE — PROGRESS NOTES
HPI:    Patient ID: Brendan Medley is a 33year old male. HPI  Patient has history of peripheral vascular disease and has complaint of having enlarged lymph node in the left groin region which is somewhat tender. He also has in the right side as well. pedal pulses and femoral and popliteal pulses in both lower extremities         ASSESSMENT/PLAN:   Enlargement of lymph node  (primary encounter diagnosis)  Inguinal pain, unspecified laterality  I would recommend bilateral inguinal ultrasound to evaluate

## 2018-04-13 ENCOUNTER — HOSPITAL ENCOUNTER (OUTPATIENT)
Dept: ULTRASOUND IMAGING | Age: 28
Discharge: HOME | End: 2018-04-13
Attending: FAMILY MEDICINE
Payer: COMMERCIAL

## 2018-04-13 DIAGNOSIS — R59.9 ENLARGEMENT OF LYMPH NODE: ICD-10-CM

## 2018-04-13 DIAGNOSIS — R10.30 INGUINAL PAIN, UNSPECIFIED LATERALITY: ICD-10-CM

## 2018-04-13 PROCEDURE — 76882 US LMTD JT/FCL EVL NVASC XTR: CPT | Performed by: FAMILY MEDICINE

## 2018-04-16 ENCOUNTER — APPOINTMENT (OUTPATIENT)
Dept: LAB | Age: 28
End: 2018-04-16
Attending: FAMILY MEDICINE
Payer: COMMERCIAL

## 2018-04-16 DIAGNOSIS — Z00.00 ROUTINE ADULT HEALTH MAINTENANCE: ICD-10-CM

## 2018-04-16 DIAGNOSIS — E55.9 VITAMIN D DEFICIENCY: ICD-10-CM

## 2018-04-16 PROCEDURE — 80061 LIPID PANEL: CPT

## 2018-04-16 PROCEDURE — 85027 COMPLETE CBC AUTOMATED: CPT

## 2018-04-16 PROCEDURE — 80053 COMPREHEN METABOLIC PANEL: CPT

## 2018-04-16 PROCEDURE — 82306 VITAMIN D 25 HYDROXY: CPT

## 2018-04-16 PROCEDURE — 36415 COLL VENOUS BLD VENIPUNCTURE: CPT

## 2018-04-17 DIAGNOSIS — R59.9 ENLARGED LYMPH NODE: Primary | ICD-10-CM

## 2018-04-19 ENCOUNTER — TELEPHONE (OUTPATIENT)
Dept: FAMILY MEDICINE CLINIC | Facility: CLINIC | Age: 28
End: 2018-04-19

## 2018-04-26 ENCOUNTER — OFFICE VISIT (OUTPATIENT)
Dept: HEMATOLOGY/ONCOLOGY | Facility: HOSPITAL | Age: 28
End: 2018-04-26
Attending: INTERNAL MEDICINE
Payer: COMMERCIAL

## 2018-04-26 VITALS
OXYGEN SATURATION: 98 % | RESPIRATION RATE: 18 BRPM | DIASTOLIC BLOOD PRESSURE: 77 MMHG | SYSTOLIC BLOOD PRESSURE: 137 MMHG | WEIGHT: 173.63 LBS | BODY MASS INDEX: 21.14 KG/M2 | TEMPERATURE: 98 F | HEIGHT: 76 IN | HEART RATE: 74 BPM

## 2018-04-26 DIAGNOSIS — I88.9 INGUINAL LYMPHADENITIS: Primary | ICD-10-CM

## 2018-04-26 PROCEDURE — 99243 OFF/OP CNSLTJ NEW/EST LOW 30: CPT | Performed by: INTERNAL MEDICINE

## 2018-04-26 NOTE — CONSULTS
[facility] Report of Consultation    Patient Name: Claudia Bermudez   YOB: 1990   Medical Record Number: [id number]   CSN: [id number]   Consulting Physician: Kaleb Lizama MD  Referring Physician(s): Nghia Regan  Date of Consultation: 4/2 HISTORY      Comment: vein removal    Family Medical History:  Family History   Problem Relation Age of Onset   • Heart Disorder Father      mitral valve repair   • Lipids Father    • Hypertension Mother    • Cancer Maternal Grandmother      pancreatic   • Oropharynx is clear. Neck: No JVD. No palpable lymphadenopathy. Neck is supple. Lymphatics: He has small bilateral posterior cervical nodes that are within normal limits. There are no axillary nodes. There are no SC node.  There are bilateral small, less

## 2018-05-17 ENCOUNTER — TELEPHONE (OUTPATIENT)
Dept: FAMILY MEDICINE CLINIC | Facility: CLINIC | Age: 28
End: 2018-05-17

## 2018-05-17 ENCOUNTER — PRIOR ORIGINAL RECORDS (OUTPATIENT)
Dept: OTHER | Age: 28
End: 2018-05-17

## 2018-05-17 ENCOUNTER — MYAURORA ACCOUNT LINK (OUTPATIENT)
Dept: OTHER | Age: 28
End: 2018-05-17

## 2018-05-17 DIAGNOSIS — I34.1 MITRAL VALVE PROLAPSE: ICD-10-CM

## 2018-05-17 DIAGNOSIS — I83.90 VARICOSE VEIN OF LEG: ICD-10-CM

## 2018-05-17 DIAGNOSIS — D69.6 THROMBOCYTOPENIA, UNSPECIFIED (HCC): ICD-10-CM

## 2018-05-17 DIAGNOSIS — I82.492 ACUTE DEEP VEIN THROMBOSIS (DVT) OF OTHER SPECIFIED VEIN OF LEFT LOWER EXTREMITY (HCC): Primary | ICD-10-CM

## 2018-05-17 DIAGNOSIS — S85.302S: ICD-10-CM

## 2018-05-17 DIAGNOSIS — Z79.01 LONG TERM (CURRENT) USE OF ANTICOAGULANTS: ICD-10-CM

## 2018-05-17 NOTE — TELEPHONE ENCOUNTER
Pt is at his cardiologist Dr Ventura Custard office. He needs a referral for today.  He states they called him for his yearly appt and he went in and needs referral stat with todays date

## 2018-05-17 NOTE — TELEPHONE ENCOUNTER
Referral approved and patient notified. I advised he call us prior to any visit to make sure referral is in place. He verbalized understanding.

## 2018-05-18 ENCOUNTER — OFFICE VISIT (OUTPATIENT)
Dept: FAMILY MEDICINE CLINIC | Facility: CLINIC | Age: 28
End: 2018-05-18

## 2018-05-18 VITALS
HEART RATE: 55 BPM | BODY MASS INDEX: 21 KG/M2 | TEMPERATURE: 98 F | WEIGHT: 172 LBS | RESPIRATION RATE: 12 BRPM | SYSTOLIC BLOOD PRESSURE: 118 MMHG | DIASTOLIC BLOOD PRESSURE: 78 MMHG | OXYGEN SATURATION: 98 %

## 2018-05-18 DIAGNOSIS — F32.A DEPRESSION, UNSPECIFIED DEPRESSION TYPE: Primary | ICD-10-CM

## 2018-05-18 DIAGNOSIS — F41.1 GENERALIZED ANXIETY DISORDER: ICD-10-CM

## 2018-05-18 PROCEDURE — 99213 OFFICE O/P EST LOW 20 MIN: CPT | Performed by: FAMILY MEDICINE

## 2018-05-18 RX ORDER — ESCITALOPRAM OXALATE 10 MG/1
10 TABLET ORAL DAILY
Qty: 90 TABLET | Refills: 0 | Status: SHIPPED | OUTPATIENT
Start: 2018-05-18 | End: 2018-08-11

## 2018-05-18 RX ORDER — CLINDAMYCIN HYDROCHLORIDE 150 MG/1
CAPSULE ORAL
Refills: 0 | COMMUNITY
Start: 2018-05-14 | End: 2018-08-20

## 2018-05-18 NOTE — PROGRESS NOTES
HPI:    Patient ID: Selina Naranjo is a 33year old male. HPI  Patient would like to restart Lexapro for depression and anxiety disorder.   He is now off Xarelto and previously he was on Lexapro but had to go off of it because of the need for blood thinn restart Lexapro 10 mg daily. Risk and benefit discussed. Patient to follow-up with me in 1 month. Patient was counseled at length regarding depression and/or anxiety.  Patient told to use any medications give as directed and risks and benefits of medic

## 2018-08-11 RX ORDER — ESCITALOPRAM OXALATE 10 MG/1
TABLET ORAL
Qty: 90 TABLET | Refills: 0 | Status: SHIPPED | OUTPATIENT
Start: 2018-08-11 | End: 2018-11-10

## 2018-08-11 NOTE — TELEPHONE ENCOUNTER
Requested Medications   Escitalopram Oxalate Oral Tablet 10 MG  Will file in chart as: ESCITALOPRAM 10 MG Oral Tab  TAKE ONE TABLET BY MOUTH ONE TIME DAILY        Disp: 90 tablet (Pharmacy requested 90)   Refills: 0     Class: Normal Start: 8/11/2018   Bk

## 2018-08-20 ENCOUNTER — OFFICE VISIT (OUTPATIENT)
Dept: FAMILY MEDICINE CLINIC | Facility: CLINIC | Age: 28
End: 2018-08-20

## 2018-08-20 VITALS
DIASTOLIC BLOOD PRESSURE: 80 MMHG | HEIGHT: 76 IN | TEMPERATURE: 98 F | WEIGHT: 186 LBS | HEART RATE: 66 BPM | BODY MASS INDEX: 22.65 KG/M2 | RESPIRATION RATE: 12 BRPM | SYSTOLIC BLOOD PRESSURE: 122 MMHG | OXYGEN SATURATION: 100 %

## 2018-08-20 DIAGNOSIS — N41.0 ACUTE PROSTATITIS: ICD-10-CM

## 2018-08-20 DIAGNOSIS — R30.0 DYSURIA: Primary | ICD-10-CM

## 2018-08-20 LAB
MULTISTIX EXPIRATION DATE: NORMAL DATE
MULTISTIX LOT#: NORMAL NUMERIC
PH, URINE: 7 (ref 4.5–8)
SPECIFIC GRAVITY: 1.01 (ref 1–1.03)
UROBILINOGEN,SEMI-QN: 0.2 MG/DL (ref 0–1.9)

## 2018-08-20 PROCEDURE — 81003 URINALYSIS AUTO W/O SCOPE: CPT | Performed by: FAMILY MEDICINE

## 2018-08-20 PROCEDURE — 99213 OFFICE O/P EST LOW 20 MIN: CPT | Performed by: FAMILY MEDICINE

## 2018-08-20 RX ORDER — CIPROFLOXACIN 500 MG/1
500 TABLET, FILM COATED ORAL 2 TIMES DAILY
Qty: 20 TABLET | Refills: 0 | Status: SHIPPED | OUTPATIENT
Start: 2018-08-20 | End: 2018-08-30

## 2018-08-20 NOTE — PROGRESS NOTES
HPI:    Patient ID: Claudia Bermudez is a 33year old male. HPI  The patient is here with complaint of having blood in the semen that he noticed a few days ago. He had this when he masturbated. He also had some pain in the left pelvic region.   No penile

## 2018-09-06 ENCOUNTER — OFFICE VISIT (OUTPATIENT)
Dept: FAMILY MEDICINE CLINIC | Facility: CLINIC | Age: 28
End: 2018-09-06

## 2018-09-06 VITALS
TEMPERATURE: 98 F | WEIGHT: 186 LBS | HEIGHT: 76 IN | DIASTOLIC BLOOD PRESSURE: 78 MMHG | HEART RATE: 73 BPM | SYSTOLIC BLOOD PRESSURE: 128 MMHG | RESPIRATION RATE: 12 BRPM | BODY MASS INDEX: 22.65 KG/M2 | OXYGEN SATURATION: 99 %

## 2018-09-06 DIAGNOSIS — R10.9 ABDOMINAL PAIN, UNSPECIFIED ABDOMINAL LOCATION: ICD-10-CM

## 2018-09-06 DIAGNOSIS — R31.9 HEMATURIA, UNSPECIFIED TYPE: ICD-10-CM

## 2018-09-06 DIAGNOSIS — R10.9 LEFT FLANK PAIN: ICD-10-CM

## 2018-09-06 DIAGNOSIS — M25.511 ACUTE PAIN OF RIGHT SHOULDER: ICD-10-CM

## 2018-09-06 DIAGNOSIS — R30.0 DYSURIA: Primary | ICD-10-CM

## 2018-09-06 LAB
MULTISTIX EXPIRATION DATE: NORMAL DATE
MULTISTIX LOT#: NORMAL NUMERIC
PH, URINE: 8.5 (ref 4.5–8)
SPECIFIC GRAVITY: 1.01 (ref 1–1.03)
UROBILINOGEN,SEMI-QN: 0.2 MG/DL (ref 0–1.9)

## 2018-09-06 PROCEDURE — 81003 URINALYSIS AUTO W/O SCOPE: CPT | Performed by: FAMILY MEDICINE

## 2018-09-06 PROCEDURE — 99214 OFFICE O/P EST MOD 30 MIN: CPT | Performed by: FAMILY MEDICINE

## 2018-09-08 NOTE — PROGRESS NOTES
HPI:    Patient ID: Claudia Bermudez is a 33year old male. HPI  Patient presents with:  Low Back Pain  Abdominal Pain: lower left pain  Shoulder Pain: right- limited ROM    Patient is here with complaint of having some back pain the lower back region.   A parathoracic region on the left side         ASSESSMENT/PLAN:   Dysuria  (primary encounter diagnosis)  Hematuria, unspecified type  Abdominal pain, unspecified abdominal location  Left flank pain  Acute pain of right shoulder  There is some blood in urine

## 2018-09-12 ENCOUNTER — HOSPITAL ENCOUNTER (OUTPATIENT)
Dept: CT IMAGING | Age: 28
Discharge: HOME | End: 2018-09-12
Attending: FAMILY MEDICINE
Payer: COMMERCIAL

## 2018-09-12 DIAGNOSIS — R30.0 DYSURIA: ICD-10-CM

## 2018-09-12 DIAGNOSIS — R10.9 ABDOMINAL PAIN, UNSPECIFIED ABDOMINAL LOCATION: ICD-10-CM

## 2018-09-12 DIAGNOSIS — R31.9 HEMATURIA, UNSPECIFIED TYPE: ICD-10-CM

## 2018-09-12 DIAGNOSIS — R10.9 LEFT FLANK PAIN: ICD-10-CM

## 2018-09-12 PROCEDURE — 74176 CT ABD & PELVIS W/O CONTRAST: CPT | Performed by: FAMILY MEDICINE

## 2018-09-26 ENCOUNTER — TELEPHONE (OUTPATIENT)
Dept: FAMILY MEDICINE CLINIC | Facility: CLINIC | Age: 28
End: 2018-09-26

## 2018-09-26 DIAGNOSIS — R10.84 GENERALIZED ABDOMINAL PAIN: Primary | ICD-10-CM

## 2018-09-26 NOTE — TELEPHONE ENCOUNTER
Pt called requesting GI referral regarding stomach pain.   Ok per  to put referral in to see Dr. Inna Ziegler

## 2018-10-09 ENCOUNTER — NURSE ONLY (OUTPATIENT)
Dept: FAMILY MEDICINE CLINIC | Facility: CLINIC | Age: 28
End: 2018-10-09

## 2018-10-09 VITALS
DIASTOLIC BLOOD PRESSURE: 70 MMHG | WEIGHT: 182 LBS | HEART RATE: 72 BPM | TEMPERATURE: 99 F | HEIGHT: 76 IN | OXYGEN SATURATION: 99 % | SYSTOLIC BLOOD PRESSURE: 100 MMHG | BODY MASS INDEX: 22.16 KG/M2 | RESPIRATION RATE: 16 BRPM

## 2018-10-09 DIAGNOSIS — A08.4 VIRAL GASTROENTERITIS: Primary | ICD-10-CM

## 2018-10-09 PROCEDURE — 99213 OFFICE O/P EST LOW 20 MIN: CPT | Performed by: NURSE PRACTITIONER

## 2018-10-09 NOTE — PATIENT INSTRUCTIONS
Rest   Push fluids  BRAT diet (banana, rice, applesauce, toast)  Advance as tolerated    Viral Gastroenteritis (Adult)    Gastroenteritis is commonly called the stomach flu.  It is most often caused by a virus that affects the stomach and intestinal tract You may use acetaminophen or NSAID medicines like ibuprofen or naproxen to control fever unless another medicine was given. If you have chronic liver or kidney disease, talk with your healthcare provider before using these medicines.  Also talk with your pr · Limit fat intake to less than 15 grams per day. Do this by avoiding margarine, butter, oils, mayonnaise, sauces, gravies, fried foods, peanut butter, meat, poultry, and fish.   · Limit fiber and avoid raw or cooked vegetables, fresh fruits (except bananas

## 2018-10-09 NOTE — PROGRESS NOTES
CHIEF COMPLAINT:   Patient presents with:  Fever: AB RM 16, 101.8 fever lastnight,   Body ache and/or chills: X 1-2 days   Headache: X 4 days       HPI:   Axel Zavala is a 33year old male who presents for complaints of fever yesterday, body aches, di /70   Pulse 72   Temp 98.7 °F (37.1 °C) (Oral)   Resp 16   Ht 76\"   Wt 182 lb   SpO2 99%   BMI 22.15 kg/m²   GENERAL: well developed, well nourished,in no apparent distress  SKIN: no rashes,no suspicious lesions  HEAD: atraumatic, normocephalic,   E Symptoms of viral gastroenteritis may include:  · Watery, loose stools  · Stomach pain or abdominal cramps  · Fever and chills  · Nausea and vomiting  · Loss of bowel control  · Headache  Home care  Gastroenteritis is transmitted by contact with the stool Diet  Follow these guidelines for food:  · Water and liquids are important so you don't get dehydrated. Drink a small amount at a time or suck on ice chips if you are vomiting. · If you eat, avoid fatty, greasy, spicy, or fried foods.   · Don't eat dairy i Follow up with your healthcare provider, or as advised. Call your provider if you don't get better within 24 hours or if diarrhea lasts more than a week. Also follow up if you are unable to keep down liquids and get dehydrated.  If a stool (diarrhea) sample

## 2018-10-10 ENCOUNTER — TELEPHONE (OUTPATIENT)
Dept: FAMILY MEDICINE CLINIC | Facility: CLINIC | Age: 28
End: 2018-10-10

## 2018-10-10 NOTE — TELEPHONE ENCOUNTER
Pt calling in regards to changing a referral from dr Dana Charles to dr Manas De Leon at   [phone] please advise

## 2018-10-10 NOTE — TELEPHONE ENCOUNTER
Spoke to patient and informed that referral was updated with Dr Annie Jansen information, advised that the referral dept has to process this referral and they will inform him once its approved    Referral changed to Dr Josefina Norman.    No future appointment

## 2018-11-10 ENCOUNTER — OFFICE VISIT (OUTPATIENT)
Dept: FAMILY MEDICINE CLINIC | Facility: CLINIC | Age: 28
End: 2018-11-10

## 2018-11-10 VITALS
TEMPERATURE: 98 F | WEIGHT: 190 LBS | RESPIRATION RATE: 12 BRPM | BODY MASS INDEX: 23.14 KG/M2 | HEIGHT: 76 IN | OXYGEN SATURATION: 100 % | DIASTOLIC BLOOD PRESSURE: 68 MMHG | SYSTOLIC BLOOD PRESSURE: 122 MMHG | HEART RATE: 76 BPM

## 2018-11-10 DIAGNOSIS — F32.A DEPRESSION, UNSPECIFIED DEPRESSION TYPE: ICD-10-CM

## 2018-11-10 DIAGNOSIS — E55.9 VITAMIN D DEFICIENCY: ICD-10-CM

## 2018-11-10 DIAGNOSIS — Z00.00 ROUTINE ADULT HEALTH MAINTENANCE: ICD-10-CM

## 2018-11-10 DIAGNOSIS — F41.9 ANXIETY: ICD-10-CM

## 2018-11-10 DIAGNOSIS — I83.12 VARICOSE VEINS OF LEFT LOWER EXTREMITY WITH INFLAMMATION: Primary | ICD-10-CM

## 2018-11-10 PROCEDURE — 99214 OFFICE O/P EST MOD 30 MIN: CPT | Performed by: FAMILY MEDICINE

## 2018-11-10 RX ORDER — ESCITALOPRAM OXALATE 10 MG/1
10 TABLET ORAL
Qty: 90 TABLET | Refills: 1 | Status: SHIPPED | OUTPATIENT
Start: 2018-11-10 | End: 2019-03-01

## 2018-11-12 NOTE — PROGRESS NOTES
HPI:    Patient ID: Neil Gonzalez is a 33year old male. HPI  Patient is here with complaint having pain on the inner aspect of his left leg near the knee area. He does have history of extensive varicose veins and DVT of lower extremity extensive.   He d on the leg no cyanosis         ASSESSMENT/PLAN:   Varicose veins of left lower extremity with inflammation  (primary encounter diagnosis)  Anxiety  Depression, unspecified depression type  Vitamin d deficiency  Routine adult health maintenance  I would rec

## 2018-11-21 PROBLEM — R19.4 CHANGE IN BOWEL HABITS: Status: ACTIVE | Noted: 2018-11-21

## 2018-11-21 PROBLEM — R14.1 GAS PAIN: Status: ACTIVE | Noted: 2018-11-21

## 2018-11-21 PROCEDURE — 88305 TISSUE EXAM BY PATHOLOGIST: CPT | Performed by: INTERNAL MEDICINE

## 2019-02-08 ENCOUNTER — OFFICE VISIT (OUTPATIENT)
Dept: FAMILY MEDICINE CLINIC | Facility: CLINIC | Age: 29
End: 2019-02-08

## 2019-02-08 VITALS
BODY MASS INDEX: 24.58 KG/M2 | RESPIRATION RATE: 14 BRPM | OXYGEN SATURATION: 98 % | WEIGHT: 201.81 LBS | HEIGHT: 76 IN | TEMPERATURE: 98 F | DIASTOLIC BLOOD PRESSURE: 74 MMHG | HEART RATE: 81 BPM | SYSTOLIC BLOOD PRESSURE: 110 MMHG

## 2019-02-08 DIAGNOSIS — R68.89 FLU-LIKE SYMPTOMS: Primary | ICD-10-CM

## 2019-02-08 PROCEDURE — 99213 OFFICE O/P EST LOW 20 MIN: CPT | Performed by: NURSE PRACTITIONER

## 2019-02-08 NOTE — PATIENT INSTRUCTIONS
-rest and stay well hydrated  -humidifier overnight  -cough: delsym or robitussin DM  -if no improvement, worsening in symptoms or a linger symptom in next 3-4 days, follow up  -go to the ED if you have difficulty breathing        Viral Syndrome (Adult)  A juice; lemonade; apple, grape, and cranberry juice; clear fruit drinks; electrolyte replacement and sports drinks; and decaffeinated teas and coffee.  If you have been diagnosed with a kidney disease, ask your healthcare provider how much and what types of

## 2019-02-08 NOTE — PROGRESS NOTES
CHIEF COMPLAINT:   Patient presents with:  Cough: body aches, chills, fever x 4 days      HPI:   Phil Olivarez is a 26year old male who presents for sudden onset of flu-like symptoms. Symptoms began 4 days ago.   Patient reports body aches, chills, headac CARDIOVASCULAR: denies chest pain or palpitations   GI: denies abdominal pain      EXAM:   /74 (BP Location: Right arm, Patient Position: Sitting, Cuff Size: large)   Pulse 81   Temp 98 °F (36.7 °C) (Oral)   Resp 14   Ht 76\"   Wt 201 lb 12.8 oz   Sp Requested Prescriptions      No prescriptions requested or ordered in this encounter       Risks, benefits, and side effects of medication explained and discussed. The patient indicates understanding of these issues and agrees to the plan.   The patient · You may use over-the-counter acetaminophen or ibuprofen for fever, muscle aching, and headache, unless another medicine was prescribed for this.  If you have chronic liver or kidney disease or ever had a stomach ulcer or gastrointestinal bleeding, talk wi · Frequent diarrhea (more than 5 times a day); blood (red or black color) or mucus in diarrhea  · Feeling weak, dizzy, or like you are going to faint  · Extreme thirst  · Fever of 100.4°F (38°C) or higher, or as directed by your healthcare provider  Date L

## 2019-02-28 VITALS
HEART RATE: 72 BPM | HEIGHT: 72 IN | DIASTOLIC BLOOD PRESSURE: 70 MMHG | SYSTOLIC BLOOD PRESSURE: 110 MMHG | BODY MASS INDEX: 23.57 KG/M2 | WEIGHT: 174 LBS

## 2019-03-01 ENCOUNTER — OFFICE VISIT (OUTPATIENT)
Dept: FAMILY MEDICINE CLINIC | Facility: CLINIC | Age: 29
End: 2019-03-01

## 2019-03-01 VITALS
OXYGEN SATURATION: 99 % | SYSTOLIC BLOOD PRESSURE: 112 MMHG | RESPIRATION RATE: 16 BRPM | HEIGHT: 76 IN | BODY MASS INDEX: 24.48 KG/M2 | TEMPERATURE: 98 F | HEART RATE: 82 BPM | DIASTOLIC BLOOD PRESSURE: 72 MMHG | WEIGHT: 201 LBS

## 2019-03-01 VITALS
HEIGHT: 72 IN | BODY MASS INDEX: 23.3 KG/M2 | HEART RATE: 82 BPM | WEIGHT: 172 LBS | SYSTOLIC BLOOD PRESSURE: 105 MMHG | DIASTOLIC BLOOD PRESSURE: 68 MMHG

## 2019-03-01 DIAGNOSIS — Z00.00 ROUTINE ADULT HEALTH MAINTENANCE: ICD-10-CM

## 2019-03-01 DIAGNOSIS — F32.A DEPRESSION, UNSPECIFIED DEPRESSION TYPE: Primary | ICD-10-CM

## 2019-03-01 DIAGNOSIS — F41.1 GENERALIZED ANXIETY DISORDER: ICD-10-CM

## 2019-03-01 PROCEDURE — 99214 OFFICE O/P EST MOD 30 MIN: CPT | Performed by: FAMILY MEDICINE

## 2019-03-01 RX ORDER — ESCITALOPRAM OXALATE 10 MG/1
10 TABLET ORAL
Qty: 90 TABLET | Refills: 1 | Status: SHIPPED | OUTPATIENT
Start: 2019-03-01 | End: 2019-10-07

## 2019-03-01 NOTE — PROGRESS NOTES
HPI:   Susan Erickson is a 26year old male that presents for Patient presents with:  Medication Request: Lexapro-patient is requesting refill. Patient is doing very well after restarting Lexapro.   He went off of it in the recent past and felt mood an effusion   Nose: patent, no nasal discharge    Throat:  No tonsillar erythema or exudate. Mouth:  No oral lesions or ulcerations, good dentition. NECK: Supple, no cervical LAD, no thyromegaly.   SKIN: No rashes, no skin lesion, no bruising, good turgor

## 2019-03-08 PROCEDURE — 80053 COMPREHEN METABOLIC PANEL: CPT | Performed by: FAMILY MEDICINE

## 2019-03-08 PROCEDURE — 85027 COMPLETE CBC AUTOMATED: CPT | Performed by: FAMILY MEDICINE

## 2019-03-08 PROCEDURE — 82306 VITAMIN D 25 HYDROXY: CPT | Performed by: FAMILY MEDICINE

## 2019-03-08 PROCEDURE — 80061 LIPID PANEL: CPT | Performed by: FAMILY MEDICINE

## 2019-03-11 ENCOUNTER — TELEPHONE (OUTPATIENT)
Dept: FAMILY MEDICINE CLINIC | Facility: CLINIC | Age: 29
End: 2019-03-11

## 2019-03-11 NOTE — TELEPHONE ENCOUNTER
Spoke to patient and informed that test results are not alarming and will be reviewed at his OV this week.

## 2019-03-14 ENCOUNTER — OFFICE VISIT (OUTPATIENT)
Dept: FAMILY MEDICINE CLINIC | Facility: CLINIC | Age: 29
End: 2019-03-14

## 2019-03-14 VITALS
SYSTOLIC BLOOD PRESSURE: 118 MMHG | WEIGHT: 198 LBS | TEMPERATURE: 98 F | RESPIRATION RATE: 16 BRPM | HEART RATE: 65 BPM | BODY MASS INDEX: 24.11 KG/M2 | DIASTOLIC BLOOD PRESSURE: 70 MMHG | HEIGHT: 76 IN

## 2019-03-14 DIAGNOSIS — M79.605 PAIN OF LEFT LOWER EXTREMITY: ICD-10-CM

## 2019-03-14 DIAGNOSIS — Z00.00 ROUTINE ADULT HEALTH MAINTENANCE: Primary | ICD-10-CM

## 2019-03-14 DIAGNOSIS — I83.12 VARICOSE VEINS OF LEFT LOWER EXTREMITY WITH INFLAMMATION: ICD-10-CM

## 2019-03-14 DIAGNOSIS — F32.A DEPRESSION, UNSPECIFIED DEPRESSION TYPE: ICD-10-CM

## 2019-03-14 PROCEDURE — 99213 OFFICE O/P EST LOW 20 MIN: CPT | Performed by: FAMILY MEDICINE

## 2019-03-14 PROCEDURE — 99395 PREV VISIT EST AGE 18-39: CPT | Performed by: FAMILY MEDICINE

## 2019-03-14 NOTE — PROGRESS NOTES
Vadim Cantu is a 26year old male who presents for a complete physical exam.   HPI:   Pt complains of having history of varicose veins and venous insufficiency in bilateral lower extremities but especially on his left leg behind the knee he has significan Total 134 <200 mg/dL    HDL Cholesterol 66 (H) 40 - 59 mg/dL    Triglycerides 34 30 - 149 mg/dL    LDL Cholesterol 61 <100 mg/dL    VLDL 7 0 - 30 mg/dL    Non HDL Chol 68 <130 mg/dL   VITAMIN D, 25-HYDROXY   Result Value Ref Range    25-Hydroxyvitamin D (T rate. Denies GARCIAS/lower extremity swelling  GI: denies abdominal pain,denies heartburn, denies n/v/c/d/change in stools/blood in stool/black stool/change in appetite  : denies nocturia or changes in urinary stream, denies scrotal mass/abnormal discharge f insight      Immunization History  Administered            Date(s) Administered    None  Deferred                Date(s) Deferred    FLULAVAL 6 months & older 0.5 ml Prefilled syringe (11583)                          01/08/2018  11/10/2018      ASSESSMENT

## 2019-03-29 RX ORDER — ALPRAZOLAM 0.5 MG/1
1 TABLET ORAL 2 TIMES DAILY
COMMUNITY
Start: 2018-05-17

## 2019-05-21 ENCOUNTER — HOSPITAL ENCOUNTER (OUTPATIENT)
Dept: ULTRASOUND IMAGING | Age: 29
Discharge: HOME | End: 2019-05-21
Attending: SURGERY
Payer: COMMERCIAL

## 2019-05-21 DIAGNOSIS — I82.492 ACUTE DEEP VEIN THROMBOSIS (DVT) OF OTHER SPECIFIED VEIN OF LEFT LOWER EXTREMITY (HCC): ICD-10-CM

## 2019-05-21 PROCEDURE — 93925 LOWER EXTREMITY STUDY: CPT | Performed by: SURGERY

## 2019-05-22 ENCOUNTER — HOSPITAL ENCOUNTER (OUTPATIENT)
Dept: ULTRASOUND IMAGING | Age: 29
Discharge: HOME | End: 2019-05-22
Attending: SURGERY
Payer: COMMERCIAL

## 2019-05-22 DIAGNOSIS — I82.492 ACUTE DEEP VEIN THROMBOSIS (DVT) OF OTHER SPECIFIED VEIN OF LEFT LOWER EXTREMITY (HCC): ICD-10-CM

## 2019-05-22 PROCEDURE — 93970 EXTREMITY STUDY: CPT | Performed by: SURGERY

## 2019-05-30 PROBLEM — Z82.49 FAMILY HISTORY OF CARDIOVASCULAR DISEASE: Status: ACTIVE | Noted: 2018-05-17

## 2019-05-30 PROBLEM — I34.0 NON-RHEUMATIC MITRAL REGURGITATION: Status: ACTIVE | Noted: 2017-05-18

## 2019-05-30 PROBLEM — I34.1 MITRAL VALVE PROLAPSE: Status: ACTIVE | Noted: 2017-05-15

## 2019-05-30 PROBLEM — I82.419 DVT OF DEEP FEMORAL VEIN (CMD): Status: ACTIVE | Noted: 2017-06-12

## 2019-06-03 NOTE — TELEPHONE ENCOUNTER
Advised patient to call on Monday to make a F/u apt if not feeling better to and if symptoms worsens or become severe go to the ER. Declines

## 2019-07-11 ENCOUNTER — TELEPHONE (OUTPATIENT)
Dept: FAMILY MEDICINE CLINIC | Facility: CLINIC | Age: 29
End: 2019-07-11

## 2019-07-11 NOTE — TELEPHONE ENCOUNTER
Confirmed with pharmacist, pt picked up #90 on 5/29/19. Advised pt, pt will re-look for medication. Advised pt to follow up with Dr. Alphonso Grossman mid-September. Last OV 3/14/19 Physical Dr. Alphonso Grossman with request for 6 month follow up. Task completed.

## 2019-10-07 DIAGNOSIS — Z00.00 ROUTINE ADULT HEALTH MAINTENANCE: ICD-10-CM

## 2019-10-08 RX ORDER — ESCITALOPRAM OXALATE 10 MG/1
TABLET ORAL
Qty: 90 TABLET | Refills: 0 | Status: SHIPPED | OUTPATIENT
Start: 2019-10-08 | End: 2020-01-13

## 2019-10-08 NOTE — TELEPHONE ENCOUNTER
Requesting Escitalopram 10mg  LOV: 3/14/19  RTC: 6 months  Last Relevant Labs: annual labs done 3/8/19  Filled: 3/1/19 #90 with 1 refills    No future appointments.     Non protocol med pended and routed to MD for approval/denial

## 2020-01-11 DIAGNOSIS — Z00.00 ROUTINE ADULT HEALTH MAINTENANCE: ICD-10-CM

## 2020-01-13 RX ORDER — ESCITALOPRAM OXALATE 10 MG/1
TABLET ORAL
Qty: 90 TABLET | Refills: 0 | Status: SHIPPED | OUTPATIENT
Start: 2020-01-13 | End: 2020-04-29

## 2020-02-26 ENCOUNTER — OFFICE VISIT (OUTPATIENT)
Dept: FAMILY MEDICINE CLINIC | Facility: CLINIC | Age: 30
End: 2020-02-26

## 2020-02-26 VITALS
TEMPERATURE: 98 F | BODY MASS INDEX: 24.87 KG/M2 | WEIGHT: 204.19 LBS | HEIGHT: 76 IN | SYSTOLIC BLOOD PRESSURE: 115 MMHG | RESPIRATION RATE: 16 BRPM | DIASTOLIC BLOOD PRESSURE: 80 MMHG | HEART RATE: 79 BPM

## 2020-02-26 DIAGNOSIS — N50.819 TESTICULAR PAIN: ICD-10-CM

## 2020-02-26 DIAGNOSIS — R10.84 GENERALIZED ABDOMINAL PAIN: Primary | ICD-10-CM

## 2020-02-26 DIAGNOSIS — R10.32 LEFT INGUINAL PAIN: ICD-10-CM

## 2020-02-26 PROBLEM — I34.0 NON-RHEUMATIC MITRAL REGURGITATION: Status: ACTIVE | Noted: 2017-05-18

## 2020-02-26 PROBLEM — Z82.49 FAMILY HISTORY OF CARDIOVASCULAR DISEASE: Status: ACTIVE | Noted: 2018-05-17

## 2020-02-26 LAB
BILIRUBIN URINE: NEGATIVE
BLOOD URINE: NEGATIVE
CONTROL RUN WITHIN 24 HOURS?: YES
GLUCOSE URINE: NEGATIVE
KETONE URINE: NEGATIVE
LEUKOCYTE ESTERASE URINE: NEGATIVE
NITRITE URINE: NEGATIVE
PH URINE: 6.5 (ref 5–8)
PROTEIN URINE: NEGATIVE
SPEC GRAVITY: 1.03 (ref 1–1.03)
URINE CLARITY: CLEAR
UROBILINOGEN URINE: 0.2

## 2020-02-26 PROCEDURE — 99214 OFFICE O/P EST MOD 30 MIN: CPT | Performed by: FAMILY MEDICINE

## 2020-02-26 RX ORDER — CIPROFLOXACIN 500 MG/1
500 TABLET, FILM COATED ORAL 2 TIMES DAILY
Qty: 20 TABLET | Refills: 0 | Status: SHIPPED | OUTPATIENT
Start: 2020-02-26 | End: 2020-03-07

## 2020-02-26 RX ORDER — ALPRAZOLAM 0.5 MG/1
1 TABLET ORAL
COMMUNITY
Start: 2018-05-17 | End: 2021-07-10 | Stop reason: ALTCHOICE

## 2020-02-26 NOTE — PROGRESS NOTES
HPI:   Erlinda Yates is a 31year old male that presents for pain/lump in the left testicle.  Pt had seen a urologist about 1 year ago but quit following up because of denial. Pt also state having left lower back pain which radiates around to the front of hi stated age, well groomed. Physical Exam:  GEN:  Patient is alert, awake and oriented, well developed, well nourished, no apparent distress.   HEENT:     Head:  Normocephalic, atraumatic    Eyes: EOMI, PERRLA, no scleral icterus, conjunctivae clear, no eye in the presence of Deandre Cantor MD.    Jenna Stanford, 2/26/2020, 10:40 AM    I, Deandre Cantor MD,  personally performed the services described in this documentation.  All medical record entries made by the scribe were at my direction and in my presen

## 2020-02-28 ENCOUNTER — TELEPHONE (OUTPATIENT)
Dept: FAMILY MEDICINE CLINIC | Facility: CLINIC | Age: 30
End: 2020-02-28

## 2020-02-28 NOTE — TELEPHONE ENCOUNTER
Spoke to pt, informed of UA results done 2/26/2020. Pt started Cipro today and has Ultrasounds scheduled for 3/6/2020. Advised pt we will call with Ultrasound results as soon as we receive them. Pt verbalized understanding and agreement.  All questions answ

## 2020-03-04 ENCOUNTER — HOSPITAL ENCOUNTER (OUTPATIENT)
Dept: GENERAL RADIOLOGY | Age: 30
Discharge: HOME | End: 2020-03-04
Attending: FAMILY MEDICINE
Payer: COMMERCIAL

## 2020-03-04 ENCOUNTER — OFFICE VISIT (OUTPATIENT)
Dept: FAMILY MEDICINE CLINIC | Facility: CLINIC | Age: 30
End: 2020-03-04

## 2020-03-04 ENCOUNTER — TELEPHONE (OUTPATIENT)
Dept: FAMILY MEDICINE CLINIC | Facility: CLINIC | Age: 30
End: 2020-03-04

## 2020-03-04 ENCOUNTER — APPOINTMENT (OUTPATIENT)
Dept: LAB | Age: 30
End: 2020-03-04
Attending: FAMILY MEDICINE
Payer: COMMERCIAL

## 2020-03-04 VITALS
HEIGHT: 76 IN | HEART RATE: 66 BPM | WEIGHT: 205.38 LBS | DIASTOLIC BLOOD PRESSURE: 80 MMHG | BODY MASS INDEX: 25.01 KG/M2 | SYSTOLIC BLOOD PRESSURE: 130 MMHG | RESPIRATION RATE: 18 BRPM | TEMPERATURE: 98 F

## 2020-03-04 DIAGNOSIS — R10.84 GENERALIZED ABDOMINAL PAIN: ICD-10-CM

## 2020-03-04 DIAGNOSIS — R10.84 GENERALIZED ABDOMINAL PAIN: Primary | ICD-10-CM

## 2020-03-04 DIAGNOSIS — N50.819 TESTICULAR PAIN: ICD-10-CM

## 2020-03-04 LAB
ALBUMIN SERPL-MCNC: 4.1 G/DL (ref 3.4–5)
ALBUMIN/GLOB SERPL: 1.1 {RATIO} (ref 1–2)
ALP LIVER SERPL-CCNC: 59 U/L (ref 45–117)
ALT SERPL-CCNC: 23 U/L (ref 16–61)
ANION GAP SERPL CALC-SCNC: 1 MMOL/L (ref 0–18)
AST SERPL-CCNC: 25 U/L (ref 15–37)
BILIRUB SERPL-MCNC: 0.3 MG/DL (ref 0.1–2)
BUN BLD-MCNC: 21 MG/DL (ref 7–18)
BUN/CREAT SERPL: 27.3 (ref 10–20)
CALCIUM BLD-MCNC: 9.4 MG/DL (ref 8.5–10.1)
CHLORIDE SERPL-SCNC: 108 MMOL/L (ref 98–112)
CO2 SERPL-SCNC: 31 MMOL/L (ref 21–32)
CREAT BLD-MCNC: 0.77 MG/DL (ref 0.7–1.3)
DEPRECATED RDW RBC AUTO: 43.2 FL (ref 35.1–46.3)
ERYTHROCYTE [DISTWIDTH] IN BLOOD BY AUTOMATED COUNT: 13.3 % (ref 11–15)
GLOBULIN PLAS-MCNC: 3.8 G/DL (ref 2.8–4.4)
GLUCOSE BLD-MCNC: 82 MG/DL (ref 70–99)
HCT VFR BLD AUTO: 41.5 % (ref 39–53)
HGB BLD-MCNC: 13.9 G/DL (ref 13–17.5)
LIPASE SERPL-CCNC: 122 U/L (ref 73–393)
M PROTEIN MFR SERPL ELPH: 7.9 G/DL (ref 6.4–8.2)
MCH RBC QN AUTO: 29.6 PG (ref 26–34)
MCHC RBC AUTO-ENTMCNC: 33.5 G/DL (ref 31–37)
MCV RBC AUTO: 88.3 FL (ref 80–100)
OSMOLALITY SERPL CALC.SUM OF ELEC: 292 MOSM/KG (ref 275–295)
PATIENT FASTING Y/N/NP: NO
PLATELET # BLD AUTO: 184 10(3)UL (ref 150–450)
POTASSIUM SERPL-SCNC: 4.4 MMOL/L (ref 3.5–5.1)
RBC # BLD AUTO: 4.7 X10(6)UL (ref 4.3–5.7)
SODIUM SERPL-SCNC: 140 MMOL/L (ref 136–145)
WBC # BLD AUTO: 5.5 X10(3) UL (ref 4–11)

## 2020-03-04 PROCEDURE — 80053 COMPREHEN METABOLIC PANEL: CPT

## 2020-03-04 PROCEDURE — 99214 OFFICE O/P EST MOD 30 MIN: CPT | Performed by: FAMILY MEDICINE

## 2020-03-04 PROCEDURE — 83690 ASSAY OF LIPASE: CPT

## 2020-03-04 PROCEDURE — 74019 RADEX ABDOMEN 2 VIEWS: CPT | Performed by: FAMILY MEDICINE

## 2020-03-04 PROCEDURE — 85027 COMPLETE CBC AUTOMATED: CPT

## 2020-03-04 PROCEDURE — 36415 COLL VENOUS BLD VENIPUNCTURE: CPT

## 2020-03-04 NOTE — PROGRESS NOTES
HPI:   Hamzah Laureano is a 31year old male that presents for abdominal pain. Pt also has pain in the left lower back, under the left rib cage, LLQ, left groin, left upper leg. Pt has schedule an US of groin and scrotum for Friday.      Pt was given Cipro for Location: Right arm, Patient Position: Sitting)   Pulse 66   Temp 98.4 °F (36.9 °C) (Oral)   Resp 18   Ht 76\"   Wt 205 lb 6.4 oz (93.2 kg)   BMI 25.00 kg/m²  Estimated body mass index is 25 kg/m² as calculated from the following:    Height as of this enco Monday. F/u in 1 week after tests have been completed. Risks, benefits, and alternatives of current treatment plan discussed in detail. Questions and concerns addressed. Red flags to RTC or ED reviewed. Patient (or parent) agrees to plan.       No

## 2020-03-04 NOTE — TELEPHONE ENCOUNTER
Per Dr. Boubacar Quezada: have pt come in today. Informed pt of Dr. Vargas Aid recommendations, pt expressed understanding and agreement, scheduled pt for 2pm today. Pt reports he did not take the Cipro last night or this morning, states he will take a dose now.

## 2020-03-04 NOTE — TELEPHONE ENCOUNTER
Future Appointments   Date Time Provider Roque Maci   3/6/2020 10:00 AM PF US RM3 PF US [place]   3/6/2020 10:45 AM PF US RM3 PF US [place]     Pt is scheduled as above but pt is feeling pain. He is not sure what he should do at this point.

## 2020-03-04 NOTE — TELEPHONE ENCOUNTER
See attached phone message. Spoke to pt, he reports he worked yesterday, he is a , and it was difficult to work due to symptoms and the physical work. He took today off.    Symptoms currently:  + Pain left lower back, under left rib cag, LLQ abdom

## 2020-03-06 ENCOUNTER — HOSPITAL ENCOUNTER (OUTPATIENT)
Dept: ULTRASOUND IMAGING | Age: 30
Discharge: HOME | End: 2020-03-06
Attending: FAMILY MEDICINE
Payer: COMMERCIAL

## 2020-03-06 DIAGNOSIS — R10.32 LEFT INGUINAL PAIN: ICD-10-CM

## 2020-03-06 DIAGNOSIS — N50.819 TESTICULAR PAIN: ICD-10-CM

## 2020-03-06 PROCEDURE — 76882 US LMTD JT/FCL EVL NVASC XTR: CPT | Performed by: FAMILY MEDICINE

## 2020-03-06 PROCEDURE — 76870 US EXAM SCROTUM: CPT | Performed by: FAMILY MEDICINE

## 2020-03-06 PROCEDURE — 93975 VASCULAR STUDY: CPT | Performed by: FAMILY MEDICINE

## 2020-03-09 ENCOUNTER — TELEPHONE (OUTPATIENT)
Dept: FAMILY MEDICINE CLINIC | Facility: CLINIC | Age: 30
End: 2020-03-09

## 2020-04-29 DIAGNOSIS — Z00.00 ROUTINE ADULT HEALTH MAINTENANCE: ICD-10-CM

## 2020-04-29 RX ORDER — ESCITALOPRAM OXALATE 10 MG/1
TABLET ORAL
Qty: 90 TABLET | Refills: 1 | Status: SHIPPED | OUTPATIENT
Start: 2020-04-29 | End: 2020-11-02

## 2020-04-29 NOTE — TELEPHONE ENCOUNTER
Requesting ESCITALOPRAM 10 MG   LOV: 3/4/20  RTC:   Last Relevant Labs: 3/4/20  Filled: 1/13/20  # 90 with 0 refills    No future appointments.

## 2020-06-21 ENCOUNTER — HOSPITAL ENCOUNTER (OUTPATIENT)
Age: 30
Discharge: HOME | End: 2020-06-21
Attending: FAMILY MEDICINE
Payer: COMMERCIAL

## 2020-06-21 VITALS
BODY MASS INDEX: 23.62 KG/M2 | OXYGEN SATURATION: 95 % | DIASTOLIC BLOOD PRESSURE: 80 MMHG | RESPIRATION RATE: 18 BRPM | SYSTOLIC BLOOD PRESSURE: 144 MMHG | HEART RATE: 96 BPM | HEIGHT: 75 IN | WEIGHT: 190 LBS | TEMPERATURE: 99 F

## 2020-06-21 DIAGNOSIS — J02.9 SORE THROAT: Primary | ICD-10-CM

## 2020-06-21 PROCEDURE — 87430 STREP A AG IA: CPT | Performed by: FAMILY MEDICINE

## 2020-06-21 PROCEDURE — 99214 OFFICE O/P EST MOD 30 MIN: CPT

## 2020-06-21 PROCEDURE — 87081 CULTURE SCREEN ONLY: CPT | Performed by: FAMILY MEDICINE

## 2020-06-21 PROCEDURE — 99203 OFFICE O/P NEW LOW 30 MIN: CPT

## 2020-06-21 NOTE — ED PROVIDER NOTES
Patient Seen in: [facility]      History   Patient presents with:  Fever    Stated Complaint: Fever, chills, body aches x 1 day    HPI    This 19-year-old male presents to the office with complaint of fever, chills, body aches a distress, A and O times 3  HEAD: Normocephalic, atraumatic  EYES: Sclera anicteric,  conjunctiva normal.  EARS: Tympanic membranes normal, EAC's normal.  NOSE: Turbinates normal, no bleeding noted.   PHARYNX:  Mild erythema is noted, no exudates noted,airwa strep culture. Until you know those results, you need to self quarantine. If your COVID test is negative and you are fever free you may return to work on Wednesday, 6/24/2020.

## 2020-06-23 NOTE — ED NOTES
Patient called and informed of negative Sars-Cov-2 by PCR  Patient verbalized understanding to RN with no further questions

## 2020-10-31 DIAGNOSIS — Z00.00 ROUTINE ADULT HEALTH MAINTENANCE: ICD-10-CM

## 2020-11-02 RX ORDER — ESCITALOPRAM OXALATE 10 MG/1
TABLET ORAL
Qty: 90 TABLET | Refills: 0 | Status: SHIPPED | OUTPATIENT
Start: 2020-11-02 | End: 2021-03-17

## 2020-11-02 NOTE — TELEPHONE ENCOUNTER
Name from pharmacy: Escitalopram Oxalate 10 Mg Tab [facility]         Will file in chart as: ESCITALOPRAM 10 MG Oral Tab    Sig: TAKE ONE TABLET BY MOUTH ONE TIME DAILY    Original sig: TAKE ONE TABLET BY MOUTH ONE TIME DAILY     Disp:  90 tablet    Refills:  0

## 2021-03-17 DIAGNOSIS — Z00.00 ROUTINE ADULT HEALTH MAINTENANCE: ICD-10-CM

## 2021-03-17 RX ORDER — ESCITALOPRAM OXALATE 10 MG/1
TABLET ORAL
Qty: 90 TABLET | Refills: 0 | Status: SHIPPED | OUTPATIENT
Start: 2021-03-17 | End: 2021-07-10

## 2021-03-17 NOTE — TELEPHONE ENCOUNTER
Future Appointments   Date Time Provider Roque Maci   4/7/2021  9:30 AM Lula Reynolds MD [facility] [address] [facility] [address]     Pt will need one month to get through to this appt.

## 2021-03-17 NOTE — TELEPHONE ENCOUNTER
Name from pharmacy: Escitalopram Oxalate 10 Mg Tab [facility]          Will file in chart as: ESCITALOPRAM 10 MG Oral Tab    Sig: TAKE ONE TABLET BY MOUTH ONE TIME DAILY     Disp:  90 tablet    Refills:  0    Start: 3/17/2021    Class: Normal    Non-formulary Fo

## 2021-07-10 ENCOUNTER — OFFICE VISIT (OUTPATIENT)
Dept: FAMILY MEDICINE CLINIC | Facility: CLINIC | Age: 31
End: 2021-07-10

## 2021-07-10 VITALS
RESPIRATION RATE: 16 BRPM | SYSTOLIC BLOOD PRESSURE: 122 MMHG | BODY MASS INDEX: 27.85 KG/M2 | DIASTOLIC BLOOD PRESSURE: 80 MMHG | TEMPERATURE: 97 F | OXYGEN SATURATION: 98 % | HEIGHT: 75 IN | WEIGHT: 224 LBS | HEART RATE: 70 BPM

## 2021-07-10 DIAGNOSIS — Z00.00 LABORATORY EXAM ORDERED AS PART OF ROUTINE GENERAL MEDICAL EXAMINATION: ICD-10-CM

## 2021-07-10 DIAGNOSIS — Z00.00 ROUTINE ADULT HEALTH MAINTENANCE: Primary | ICD-10-CM

## 2021-07-10 PROCEDURE — 3008F BODY MASS INDEX DOCD: CPT | Performed by: FAMILY MEDICINE

## 2021-07-10 PROCEDURE — 3079F DIAST BP 80-89 MM HG: CPT | Performed by: FAMILY MEDICINE

## 2021-07-10 PROCEDURE — 99395 PREV VISIT EST AGE 18-39: CPT | Performed by: FAMILY MEDICINE

## 2021-07-10 PROCEDURE — 3074F SYST BP LT 130 MM HG: CPT | Performed by: FAMILY MEDICINE

## 2021-07-10 RX ORDER — ESCITALOPRAM OXALATE 10 MG/1
10 TABLET ORAL DAILY
Qty: 90 TABLET | Refills: 1 | Status: SHIPPED | OUTPATIENT
Start: 2021-07-10 | End: 2022-01-03

## 2021-07-12 NOTE — PROGRESS NOTES
Jarrod Moon is a 27year old male who presents for a complete physical exam.   HPI:   Pt complains of nothing. He is doing well with Lexapro 10 mg daily. No side effects. It is helping control his anxiety.   He denies any depression or suicidal thoughts Paternal Grandmother    • Heart Disorder Paternal Grandfather       Social History:  Social History    Tobacco Use      Smoking status: Former Smoker        Quit date: 2011        Years since quittin.6      Smokeless tobacco: Never Used    Vaping non-icteric  NECK: supple, no adenopathy/thyromegaly/thyroid nodules/masses  CHEST: no chest tenderness  LUNGS: CTA A/P, no wheezes/ronchi/rales/crackles, normal air excursion  CARDIOVASCULAR: RRR, no murmur, no lower extremity edema, pedal and femoral pul file for this visit.   Orders Placed This Encounter      CBC With Differential With Platelet      Lipid Panel      Comp Metabolic Panel (14)      TSH W Reflex To Free T4

## 2021-08-30 ENCOUNTER — OFFICE VISIT (OUTPATIENT)
Dept: FAMILY MEDICINE CLINIC | Facility: CLINIC | Age: 31
End: 2021-08-30

## 2021-08-30 VITALS
HEIGHT: 75 IN | SYSTOLIC BLOOD PRESSURE: 128 MMHG | WEIGHT: 227.38 LBS | RESPIRATION RATE: 16 BRPM | TEMPERATURE: 98 F | BODY MASS INDEX: 28.27 KG/M2 | HEART RATE: 82 BPM | DIASTOLIC BLOOD PRESSURE: 80 MMHG

## 2021-08-30 DIAGNOSIS — B07.8 OTHER VIRAL WARTS: Primary | ICD-10-CM

## 2021-08-30 PROCEDURE — 17110 DESTRUCTION B9 LES UP TO 14: CPT | Performed by: FAMILY MEDICINE

## 2021-08-30 PROCEDURE — 3074F SYST BP LT 130 MM HG: CPT | Performed by: FAMILY MEDICINE

## 2021-08-30 PROCEDURE — 3008F BODY MASS INDEX DOCD: CPT | Performed by: FAMILY MEDICINE

## 2021-08-30 PROCEDURE — 3079F DIAST BP 80-89 MM HG: CPT | Performed by: FAMILY MEDICINE

## 2021-08-31 PROBLEM — B07.8 OTHER VIRAL WARTS: Status: ACTIVE | Noted: 2021-08-31

## 2021-08-31 NOTE — PROGRESS NOTES
HPI:   Ankit Prieto is a 27year old male that presents for Patient presents with:  Warts: left uuper thumb - would like to get it treated -> consent signed    Patient states that the wart does irritate him at times.     Past medical, surgical, family and s seconds x 3 to the lesion. Patient tolerated well. Bacitracin ointment and Band-Aid applied. ASSESSMENT AND PLAN:      1. Other viral warts    Cryotherapy #1 treatment done today.   Patient RTC in 1 to 2 weeks for 2nd treatment then will observe after

## 2021-09-10 ENCOUNTER — OFFICE VISIT (OUTPATIENT)
Dept: FAMILY MEDICINE CLINIC | Facility: CLINIC | Age: 31
End: 2021-09-10

## 2021-09-10 VITALS
TEMPERATURE: 97 F | SYSTOLIC BLOOD PRESSURE: 120 MMHG | HEIGHT: 75 IN | WEIGHT: 227.38 LBS | OXYGEN SATURATION: 99 % | DIASTOLIC BLOOD PRESSURE: 80 MMHG | BODY MASS INDEX: 28.27 KG/M2 | HEART RATE: 64 BPM | RESPIRATION RATE: 16 BRPM

## 2021-09-10 DIAGNOSIS — B07.9 VIRAL WARTS, UNSPECIFIED TYPE: Primary | ICD-10-CM

## 2021-09-10 PROCEDURE — 17110 DESTRUCTION B9 LES UP TO 14: CPT | Performed by: FAMILY MEDICINE

## 2021-09-10 PROCEDURE — 3074F SYST BP LT 130 MM HG: CPT | Performed by: FAMILY MEDICINE

## 2021-09-10 PROCEDURE — 3079F DIAST BP 80-89 MM HG: CPT | Performed by: FAMILY MEDICINE

## 2021-09-10 PROCEDURE — 3008F BODY MASS INDEX DOCD: CPT | Performed by: FAMILY MEDICINE

## 2021-09-14 NOTE — PROGRESS NOTES
HPI:   Vadim Cantu is a 27year old male that presents for complaint of having a wartlike lesion on the top of his hand near the thumb region. Is been there for a while and it irritates him.   He did have one cryotherapy treatment recently and is here for apparent distress. Skin: 3 mm wartlike lesion noted on the dorsum of the left hand near the metacarpal region #1. Procedure: Cryotherapy was done for 30 seconds x 3 and patient tolerated well. ASSESSMENT AND PLAN:      1.  Viral warts, unspecified

## 2021-09-27 NOTE — TELEPHONE ENCOUNTER
Information given. Cyclosporine Pregnancy And Lactation Text: This medication is Pregnancy Category C and it isn't know if it is safe during pregnancy. This medication is excreted in breast milk.

## 2021-10-01 ENCOUNTER — APPOINTMENT (OUTPATIENT)
Dept: CT IMAGING | Age: 31
End: 2021-10-01
Attending: EMERGENCY MEDICINE
Payer: COMMERCIAL

## 2021-10-01 ENCOUNTER — HOSPITAL ENCOUNTER (EMERGENCY)
Age: 31
Discharge: HOME | End: 2021-10-01
Attending: EMERGENCY MEDICINE
Payer: COMMERCIAL

## 2021-10-01 VITALS
SYSTOLIC BLOOD PRESSURE: 138 MMHG | HEART RATE: 68 BPM | TEMPERATURE: 98 F | RESPIRATION RATE: 18 BRPM | WEIGHT: 220 LBS | HEIGHT: 75 IN | OXYGEN SATURATION: 99 % | BODY MASS INDEX: 27.35 KG/M2 | DIASTOLIC BLOOD PRESSURE: 74 MMHG

## 2021-10-01 DIAGNOSIS — H81.10 BENIGN PAROXYSMAL POSITIONAL VERTIGO, UNSPECIFIED LATERALITY: ICD-10-CM

## 2021-10-01 DIAGNOSIS — G44.209 ACUTE NON INTRACTABLE TENSION-TYPE HEADACHE: Primary | ICD-10-CM

## 2021-10-01 PROCEDURE — 99284 EMERGENCY DEPT VISIT MOD MDM: CPT

## 2021-10-01 PROCEDURE — 93005 ELECTROCARDIOGRAM TRACING: CPT

## 2021-10-01 PROCEDURE — 80048 BASIC METABOLIC PNL TOTAL CA: CPT | Performed by: EMERGENCY MEDICINE

## 2021-10-01 PROCEDURE — 93010 ELECTROCARDIOGRAM REPORT: CPT

## 2021-10-01 PROCEDURE — 96374 THER/PROPH/DIAG INJ IV PUSH: CPT

## 2021-10-01 PROCEDURE — 70450 CT HEAD/BRAIN W/O DYE: CPT | Performed by: EMERGENCY MEDICINE

## 2021-10-01 PROCEDURE — 96361 HYDRATE IV INFUSION ADD-ON: CPT

## 2021-10-01 PROCEDURE — 85025 COMPLETE CBC W/AUTO DIFF WBC: CPT | Performed by: EMERGENCY MEDICINE

## 2021-10-01 RX ORDER — KETOROLAC TROMETHAMINE 15 MG/ML
15 INJECTION, SOLUTION INTRAMUSCULAR; INTRAVENOUS ONCE
Status: COMPLETED | OUTPATIENT
Start: 2021-10-01 | End: 2021-10-01

## 2021-10-01 RX ORDER — MECLIZINE HYDROCHLORIDE 25 MG/1
25 TABLET ORAL 3 TIMES DAILY PRN
Qty: 15 TABLET | Refills: 0 | Status: SHIPPED | OUTPATIENT
Start: 2021-10-01

## 2021-10-01 RX ORDER — MECLIZINE HYDROCHLORIDE 25 MG/1
25 TABLET ORAL ONCE
Status: COMPLETED | OUTPATIENT
Start: 2021-10-01 | End: 2021-10-01

## 2021-10-01 NOTE — ED INITIAL ASSESSMENT (HPI)
Pt states he was at work today when he started to feel dizziness and fatigue. Denies nausea or vomiting.  Denies chest pain/ YEHUDA

## 2021-10-02 NOTE — ED PROVIDER NOTES
Patient Seen in: THE [facility] Emergency Department In [place]      History   Patient presents with:  Dizziness    Stated Complaint: DIZZINESS SINCE ABOUT 530PM.    Subjective:   HPI    69-year-old male presents to ED with complaint of dizziness worse with t 10/01/21 1846 147/89   Pulse 10/01/21 1845 66   Resp 10/01/21 1845 18   Temp 10/01/21 1845 98 °F (36.7 °C)   Temp src --    SpO2 10/01/21 1845 100 %   O2 Device 10/01/21 1845 None (Room air)       Current:/74   Pulse 68   Temp 98 °F (36.7 °C)   Resp Status                     ---------                               -----------         ------                     CBC W/ DIFFERENTIAL[[id number]]                              Final result                 Please view results for these tests on the indiv medications    aspirin-acetaminophen-caffeine (EXCEDRIN MIGRAINE) 250-250-65 MG Oral Tab  Take 1 tablet by mouth every 6 (six) hours as needed for Pain., Normal, Disp-10 tablet, R-0    meclizine 25 MG Oral Tab  Take 1 tablet (25 mg total) by mouth 3 (three

## 2021-10-05 ENCOUNTER — TELEPHONE (OUTPATIENT)
Dept: FAMILY MEDICINE CLINIC | Facility: CLINIC | Age: 31
End: 2021-10-05

## 2021-10-05 RX ORDER — ALPRAZOLAM 0.5 MG/1
0.5 TABLET ORAL 2 TIMES DAILY PRN
Qty: 30 TABLET | Refills: 0 | Status: SHIPPED | OUTPATIENT
Start: 2021-10-05 | End: 2021-12-15

## 2021-10-05 NOTE — TELEPHONE ENCOUNTER
Patient states he would like re-start Xanax 0.25mg he was previously taking it before but stopped as he states he did not needed it anymore, but feels like he can use it to help with stress along with taking the Escitalopram.  Offered an appt but declined

## 2021-10-06 ENCOUNTER — TELEPHONE (OUTPATIENT)
Dept: FAMILY MEDICINE CLINIC | Facility: CLINIC | Age: 31
End: 2021-10-06

## 2021-10-06 NOTE — TELEPHONE ENCOUNTER
Pt states that he called yesterday to get a 0.25 dosage of xanax and the prescription that he picked up was only . 05mg he wants a corrected refill sent. Or a call back.

## 2021-10-06 NOTE — TELEPHONE ENCOUNTER
Spoke with pt, informed him that he had been on Xanax 0.5mg in the past and is a higher dose than 0.25mg. Pt verbalized understanding and agreement. All questions answered.

## 2021-10-29 ENCOUNTER — OFFICE VISIT (OUTPATIENT)
Dept: FAMILY MEDICINE CLINIC | Facility: CLINIC | Age: 31
End: 2021-10-29

## 2021-10-29 VITALS
TEMPERATURE: 97 F | HEART RATE: 60 BPM | DIASTOLIC BLOOD PRESSURE: 80 MMHG | HEIGHT: 75 IN | OXYGEN SATURATION: 99 % | SYSTOLIC BLOOD PRESSURE: 120 MMHG | WEIGHT: 234.25 LBS | RESPIRATION RATE: 16 BRPM | BODY MASS INDEX: 29.12 KG/M2

## 2021-10-29 DIAGNOSIS — R42 DIZZINESS: Primary | ICD-10-CM

## 2021-10-29 DIAGNOSIS — B07.8 OTHER VIRAL WARTS: ICD-10-CM

## 2021-10-29 PROCEDURE — 3008F BODY MASS INDEX DOCD: CPT | Performed by: FAMILY MEDICINE

## 2021-10-29 PROCEDURE — 99214 OFFICE O/P EST MOD 30 MIN: CPT | Performed by: FAMILY MEDICINE

## 2021-10-29 PROCEDURE — 3079F DIAST BP 80-89 MM HG: CPT | Performed by: FAMILY MEDICINE

## 2021-10-29 PROCEDURE — 3074F SYST BP LT 130 MM HG: CPT | Performed by: FAMILY MEDICINE

## 2021-11-01 NOTE — PROGRESS NOTES
HPI:   Luis Armando Potts is a 27year old male that presents for Patient presents with:  ER F/U: from 10/1 for vertigo - better today  Warts: f/up on finger - getting better  Immunization/Injection: declined flu vaccine today    Patient is here for follow-up of Tab, Take 1 tablet by mouth daily. , Disp: , Rfl:     REVIEW OF SYSTEMS:     Comprehensive ROS negative except where noted in HPI    PHYSICAL EXAM:   /80   Pulse 60   Temp 97.2 °F (36.2 °C) (Temporal)   Resp 16   Ht 6' 3\" (1.905 m)   Wt 234 lb 4 oz ( extremities    ASSESSMENT AND PLAN:      1. Dizziness    2. Other viral warts    Partial response to viral wart of hand.   I would recommend salicylic acid treatment daily and follow-up in 2 to 3 weeks and if not significantly better after that then I would

## 2021-12-15 RX ORDER — ALPRAZOLAM 0.5 MG/1
TABLET ORAL
Qty: 30 TABLET | Refills: 0 | Status: SHIPPED | OUTPATIENT
Start: 2021-12-15

## 2021-12-15 NOTE — TELEPHONE ENCOUNTER
Requesting Alprazolam 0.5mg  LOV: 10/29/21 acute  RTC: prn  Last Relevant Labs: 3/4/2020  Filled: 10/5/21 #30 with 0 refills    No future appointments.     Rx pended and routed for approval/denial

## 2022-01-03 DIAGNOSIS — Z00.00 ROUTINE ADULT HEALTH MAINTENANCE: ICD-10-CM

## 2022-01-03 NOTE — TELEPHONE ENCOUNTER
Requested Prescriptions     Pending Prescriptions Disp Refills   • ESCITALOPRAM 10 MG Oral Tab [Pharmacy Med Name: Escitalopram Oxalate 10 Mg Tab Auro] 90 tablet 0     Sig: TAKE ONE TABLET BY MOUTH ONE TIME DAILY     LOV: 10/29/21  RTC:   Last Relevant Lab

## 2022-01-04 RX ORDER — ESCITALOPRAM OXALATE 10 MG/1
TABLET ORAL
Qty: 90 TABLET | Refills: 1 | Status: SHIPPED | OUTPATIENT
Start: 2022-01-04

## 2022-03-02 ENCOUNTER — TELEMEDICINE (OUTPATIENT)
Dept: FAMILY MEDICINE CLINIC | Facility: CLINIC | Age: 32
End: 2022-03-02

## 2022-03-02 DIAGNOSIS — R05.9 COUGH: Primary | ICD-10-CM

## 2022-03-02 PROCEDURE — 99214 OFFICE O/P EST MOD 30 MIN: CPT | Performed by: FAMILY MEDICINE

## 2022-03-02 RX ORDER — AZITHROMYCIN 250 MG/1
TABLET, FILM COATED ORAL
Qty: 6 TABLET | Refills: 0 | Status: SHIPPED | OUTPATIENT
Start: 2022-03-02 | End: 2022-03-07

## 2022-06-18 DIAGNOSIS — Z00.00 ROUTINE ADULT HEALTH MAINTENANCE: ICD-10-CM

## 2022-06-20 RX ORDER — ESCITALOPRAM OXALATE 10 MG/1
10 TABLET ORAL DAILY
Qty: 90 TABLET | Refills: 1 | OUTPATIENT
Start: 2022-06-20

## 2022-06-20 NOTE — TELEPHONE ENCOUNTER
Mauri Mckeon MD  [facility] 36 minutes ago (2:20 PM)         Needs appt. If running out, 30 day supply ok.

## 2022-07-16 ENCOUNTER — OFFICE VISIT (OUTPATIENT)
Dept: FAMILY MEDICINE CLINIC | Facility: CLINIC | Age: 32
End: 2022-07-16
Payer: COMMERCIAL

## 2022-07-16 VITALS
OXYGEN SATURATION: 99 % | HEIGHT: 75 IN | WEIGHT: 241 LBS | BODY MASS INDEX: 29.97 KG/M2 | DIASTOLIC BLOOD PRESSURE: 80 MMHG | TEMPERATURE: 98 F | HEART RATE: 66 BPM | SYSTOLIC BLOOD PRESSURE: 120 MMHG | RESPIRATION RATE: 16 BRPM

## 2022-07-16 DIAGNOSIS — Z00.00 ROUTINE ADULT HEALTH MAINTENANCE: Primary | ICD-10-CM

## 2022-07-16 DIAGNOSIS — F41.1 GENERALIZED ANXIETY DISORDER: ICD-10-CM

## 2022-07-16 DIAGNOSIS — R53.83 FATIGUE, UNSPECIFIED TYPE: ICD-10-CM

## 2022-07-16 DIAGNOSIS — F32.A DEPRESSION, UNSPECIFIED DEPRESSION TYPE: ICD-10-CM

## 2022-07-16 PROCEDURE — 3008F BODY MASS INDEX DOCD: CPT | Performed by: FAMILY MEDICINE

## 2022-07-16 PROCEDURE — 99395 PREV VISIT EST AGE 18-39: CPT | Performed by: FAMILY MEDICINE

## 2022-07-16 PROCEDURE — 3074F SYST BP LT 130 MM HG: CPT | Performed by: FAMILY MEDICINE

## 2022-07-16 PROCEDURE — 3079F DIAST BP 80-89 MM HG: CPT | Performed by: FAMILY MEDICINE

## 2022-07-16 PROCEDURE — 90471 IMMUNIZATION ADMIN: CPT | Performed by: FAMILY MEDICINE

## 2022-07-16 PROCEDURE — 90677 PCV20 VACCINE IM: CPT | Performed by: FAMILY MEDICINE

## 2022-07-16 RX ORDER — ESCITALOPRAM OXALATE 10 MG/1
10 TABLET ORAL DAILY
Qty: 90 TABLET | Refills: 1 | Status: SHIPPED | OUTPATIENT
Start: 2022-07-16

## 2022-09-01 ENCOUNTER — LAB ENCOUNTER (OUTPATIENT)
Dept: LAB | Age: 32
End: 2022-09-01
Attending: FAMILY MEDICINE
Payer: COMMERCIAL

## 2022-09-01 DIAGNOSIS — Z00.00 ROUTINE ADULT HEALTH MAINTENANCE: ICD-10-CM

## 2022-09-01 DIAGNOSIS — R53.83 FATIGUE, UNSPECIFIED TYPE: ICD-10-CM

## 2022-09-01 LAB
ALBUMIN SERPL-MCNC: 4.2 G/DL (ref 3.4–5)
ALBUMIN/GLOB SERPL: 1 {RATIO} (ref 1–2)
ALP LIVER SERPL-CCNC: 72 U/L
ALT SERPL-CCNC: 28 U/L
ANION GAP SERPL CALC-SCNC: 4 MMOL/L (ref 0–18)
AST SERPL-CCNC: 25 U/L (ref 15–37)
BASOPHILS # BLD AUTO: 0.02 X10(3) UL (ref 0–0.2)
BASOPHILS NFR BLD AUTO: 0.3 %
BILIRUB SERPL-MCNC: 0.4 MG/DL (ref 0.1–2)
BUN BLD-MCNC: 20 MG/DL (ref 7–18)
CALCIUM BLD-MCNC: 9.8 MG/DL (ref 8.5–10.1)
CHLORIDE SERPL-SCNC: 106 MMOL/L (ref 98–112)
CHOLEST SERPL-MCNC: 175 MG/DL (ref ?–200)
CO2 SERPL-SCNC: 28 MMOL/L (ref 21–32)
CREAT BLD-MCNC: 0.96 MG/DL
EOSINOPHIL # BLD AUTO: 0.53 X10(3) UL (ref 0–0.7)
EOSINOPHIL NFR BLD AUTO: 7 %
ERYTHROCYTE [DISTWIDTH] IN BLOOD BY AUTOMATED COUNT: 13.6 %
FASTING PATIENT LIPID ANSWER: YES
FASTING STATUS PATIENT QL REPORTED: YES
GFR SERPLBLD BASED ON 1.73 SQ M-ARVRAT: 107 ML/MIN/1.73M2 (ref 60–?)
GLOBULIN PLAS-MCNC: 4.1 G/DL (ref 2.8–4.4)
GLUCOSE BLD-MCNC: 77 MG/DL (ref 70–99)
HCT VFR BLD AUTO: 45.1 %
HDLC SERPL-MCNC: 70 MG/DL (ref 40–59)
HGB BLD-MCNC: 14.4 G/DL
IMM GRANULOCYTES # BLD AUTO: 0.02 X10(3) UL (ref 0–1)
IMM GRANULOCYTES NFR BLD: 0.3 %
LDLC SERPL CALC-MCNC: 88 MG/DL (ref ?–100)
LYMPHOCYTES # BLD AUTO: 2 X10(3) UL (ref 1–4)
LYMPHOCYTES NFR BLD AUTO: 26.3 %
MCH RBC QN AUTO: 29.3 PG (ref 26–34)
MCHC RBC AUTO-ENTMCNC: 31.9 G/DL (ref 31–37)
MCV RBC AUTO: 91.7 FL
MONOCYTES # BLD AUTO: 0.58 X10(3) UL (ref 0.1–1)
MONOCYTES NFR BLD AUTO: 7.6 %
NEUTROPHILS # BLD AUTO: 4.46 X10 (3) UL (ref 1.5–7.7)
NEUTROPHILS # BLD AUTO: 4.46 X10(3) UL (ref 1.5–7.7)
NEUTROPHILS NFR BLD AUTO: 58.5 %
NONHDLC SERPL-MCNC: 105 MG/DL (ref ?–130)
OSMOLALITY SERPL CALC.SUM OF ELEC: 287 MOSM/KG (ref 275–295)
PLATELET # BLD AUTO: 196 10(3)UL (ref 150–450)
POTASSIUM SERPL-SCNC: 4 MMOL/L (ref 3.5–5.1)
PROT SERPL-MCNC: 8.3 G/DL (ref 6.4–8.2)
RBC # BLD AUTO: 4.92 X10(6)UL
SODIUM SERPL-SCNC: 138 MMOL/L (ref 136–145)
T4 FREE SERPL-MCNC: 0.9 NG/DL (ref 0.8–1.7)
TRIGL SERPL-MCNC: 97 MG/DL (ref 30–149)
TSI SER-ACNC: 2.53 MIU/ML (ref 0.36–3.74)
VIT D+METAB SERPL-MCNC: 22.9 NG/ML (ref 30–100)
VLDLC SERPL CALC-MCNC: 16 MG/DL (ref 0–30)
WBC # BLD AUTO: 7.6 X10(3) UL (ref 4–11)

## 2022-09-01 PROCEDURE — 82306 VITAMIN D 25 HYDROXY: CPT

## 2022-09-01 PROCEDURE — 84439 ASSAY OF FREE THYROXINE: CPT

## 2022-09-01 PROCEDURE — 80053 COMPREHEN METABOLIC PANEL: CPT

## 2022-09-01 PROCEDURE — 80061 LIPID PANEL: CPT

## 2022-09-01 PROCEDURE — 84443 ASSAY THYROID STIM HORMONE: CPT

## 2022-09-01 PROCEDURE — 85025 COMPLETE CBC W/AUTO DIFF WBC: CPT

## 2022-09-01 PROCEDURE — 36415 COLL VENOUS BLD VENIPUNCTURE: CPT

## 2022-12-05 ENCOUNTER — OFFICE VISIT (OUTPATIENT)
Dept: FAMILY MEDICINE CLINIC | Facility: CLINIC | Age: 32
End: 2022-12-05
Payer: COMMERCIAL

## 2022-12-05 VITALS
DIASTOLIC BLOOD PRESSURE: 95 MMHG | HEIGHT: 76 IN | SYSTOLIC BLOOD PRESSURE: 130 MMHG | TEMPERATURE: 98 F | WEIGHT: 190 LBS | OXYGEN SATURATION: 98 % | HEART RATE: 80 BPM | RESPIRATION RATE: 20 BRPM | BODY MASS INDEX: 23.14 KG/M2

## 2022-12-05 DIAGNOSIS — J01.00 ACUTE MAXILLARY SINUSITIS, RECURRENCE NOT SPECIFIED: ICD-10-CM

## 2022-12-05 DIAGNOSIS — J11.1 INFLUENZA-LIKE ILLNESS: Primary | ICD-10-CM

## 2022-12-05 PROCEDURE — 3075F SYST BP GE 130 - 139MM HG: CPT | Performed by: NURSE PRACTITIONER

## 2022-12-05 PROCEDURE — 99213 OFFICE O/P EST LOW 20 MIN: CPT | Performed by: NURSE PRACTITIONER

## 2022-12-05 PROCEDURE — 3008F BODY MASS INDEX DOCD: CPT | Performed by: NURSE PRACTITIONER

## 2022-12-05 PROCEDURE — 3080F DIAST BP >= 90 MM HG: CPT | Performed by: NURSE PRACTITIONER

## 2022-12-05 RX ORDER — DOXYCYCLINE HYCLATE 100 MG/1
100 CAPSULE ORAL 2 TIMES DAILY
Qty: 14 CAPSULE | Refills: 0 | Status: SHIPPED | OUTPATIENT
Start: 2022-12-05 | End: 2022-12-12

## 2022-12-05 NOTE — PATIENT INSTRUCTIONS
PLAN: Doxycycline, take as directed. Finish all the medication even if you feel better. Salt water gargles (1 tsp. Salt in 6 oz lukewarm water), use several times daily to help decrease swelling and pain. Hydrate! (cold or hot based on comfort). Drink lots of water or other non dehydrating liquids to help with illness. Salty foods and soups can help with throat pain and swelling as well. Hand washing-use hand  or wash hands frequently, cover your cough or sneeze, do not share towels or drinks with others. May use Tylenol or Ibuprofen over the counter for pain/comfort if needed, you may stop Mucinex for now. Follow up in 1 week with PCP if symptoms persist,  or sooner if worsening symptoms. Seek immediate care if inability to swallow or breathe.

## 2023-03-08 ENCOUNTER — NURSE ONLY (OUTPATIENT)
Dept: FAMILY MEDICINE CLINIC | Facility: CLINIC | Age: 33
End: 2023-03-08
Payer: COMMERCIAL

## 2023-03-08 DIAGNOSIS — Z23 NEED FOR VACCINATION: Primary | ICD-10-CM

## 2023-03-08 PROCEDURE — 90471 IMMUNIZATION ADMIN: CPT | Performed by: FAMILY MEDICINE

## 2023-03-08 PROCEDURE — 90686 IIV4 VACC NO PRSV 0.5 ML IM: CPT | Performed by: FAMILY MEDICINE

## 2023-03-08 NOTE — PROGRESS NOTES
Giulianobeenaalana Mckeon presents today for nurse visit. Flu vaccine given IM in right deltoid. Patient tolerated well. Left office in stable condition.

## 2023-04-24 NOTE — TELEPHONE ENCOUNTER
Requesting Alprazolam 0.5mg  LOV: 7/16/22 Physical  RTC: 1 year  Last Relevant Labs: 9/1/22  Filled: 3/7/23 #30 with 0 refills    No future appointments.     Per [place] , last dispensed 3/7/23 #30    Rx pended and routed for approval/denial

## 2023-04-25 RX ORDER — ALPRAZOLAM 0.5 MG/1
TABLET ORAL
Qty: 30 TABLET | Refills: 0 | Status: SHIPPED | OUTPATIENT
Start: 2023-04-25

## 2023-06-29 NOTE — PROGRESS NOTES
Anesthesia Evaluation     Patient summary reviewed and Nursing notes reviewed   no history of anesthetic complications:   NPO Solid Status: > 8 hours  NPO Liquid Status: > 2 hours           Airway   Dental      Pulmonary    (+) a smoker Former, COPD,shortness of breath  Cardiovascular     ECG reviewed    (+) hypertension, valvular problems/murmurs AS and MRhyperlipidemia      Neuro/Psych  (+) psychiatric history Anxiety and Depression  GI/Hepatic/Renal/Endo    (+) hiatal hernia, GERD, thyroid problem hypothyroidism    Musculoskeletal     Abdominal    Substance History      OB/GYN          Other   arthritis,     ROS/Med Hx Other: Additional History:  LAFB, pulmonary hypertension, chronic bronchitis, CREST syndrome, Raynaud's    Echo:  ·  Left ventricular ejection fraction appears to be 56 - 60%.     Indication  Shortness of breath    Technically satisfactory study.  Mitral valve is thickened with mitral annular calcification and mild mitral regurgitation.  Tricuspid valve is structurally normal.  Aortic valve is thickened with decreased opening motion.  Gradient across the aortic valve is 22/12 mmHg and valve area of 1.6 cm².  Pulmonic valve could not be well visualized.  No evidence for tricuspid or aortic regurgitation is seen by Doppler study.  Left atrium is enlarged.  Right atrium is normal in size.  Left ventricle is normal in size and contractility with ejection fraction of 60%.  Right ventricle is normal in size.  Atrial septum is intact.  Aorta is normal.  No pericardial effusion or intracardiac thrombus is seen.    Impression  Mitral annular calcification  Mild mitral regurgitation  Left atrial enlargement  Moderate aortic valve stenosis with gradient of 22/12 mmHg and valve area of 1.6 m² centimeters.  Normal left ventricular size and contractility.      Stress Test:  Lexiscan Cardiolite test is negative for myocardial ischemia.    Gated SPECT images revealed normal left ventricular size and contractility  Sallyanne Soulier is a 28year old male. HPI:   Patient is here for follow-up after seeing his counselor/ for medication initiation for Xanax.    spoke with me and wants pt on medication for anxiety --discussed Xanax with pt and he a daily. Disp: 90 tablet Rfl: 3      Past Medical History   Diagnosis Date   • Keloid scar    • Varicose vein    • Chlamydia contact, treated    • Mitral valve prolapse       Social History:    Smoking Status: Former Smoker                   Packs/Day: 0.00 with ejection fraction of 87%.       PSH:  THYROIDECTOMY, PARTIAL UPPER GASTROINTESTINAL ENDOSCOPY  CATARACT EXTRACTION BREAST BIOPSY  COLONOSCOPY APPENDECTOMY  RECTAL PROLAPSE REPAIR BLADDER REPAIR                  Anesthesia Plan    ASA 4     general and MAC     (Patient identified; pre-operative vital signs, all relevant labs/studies, complete medical/surgical/anesthetic history, full medication list, full allergy list, and NPO status obtained/reviewed; physical assessment performed; anesthetic options, side effects, potential complications, risks, and benefits discussed; questions answered; written anesthesia consent obtained; patient cleared for procedure; anesthesia machine and equipment checked and functioning)  intravenous induction     Anesthetic plan, risks, benefits, and alternatives have been provided, discussed and informed consent has been obtained with: patient.    Plan discussed with CRNA and CAA.    CODE STATUS:          and has designated the current job restrictions. He is to stay on the Xarelto. Dr. Sargent Needs should also order the US of the left leg to follow up on the DVT. Pt aware and agrees to plans. He is to keep swimming in the pool.   He is to follow up as needed in

## 2023-07-08 DIAGNOSIS — Z00.00 ROUTINE ADULT HEALTH MAINTENANCE: ICD-10-CM

## 2023-07-10 RX ORDER — ESCITALOPRAM OXALATE 10 MG/1
10 TABLET ORAL DAILY
Qty: 90 TABLET | Refills: 0 | Status: SHIPPED | OUTPATIENT
Start: 2023-07-10

## 2023-07-10 RX ORDER — ALPRAZOLAM 0.5 MG/1
0.5 TABLET ORAL 2 TIMES DAILY PRN
Qty: 30 TABLET | Refills: 0 | Status: SHIPPED | OUTPATIENT
Start: 2023-07-10

## 2023-07-10 NOTE — TELEPHONE ENCOUNTER
Requesting Alprazolam 0.5mg  Filled: 4/25/23 #30 with 0 refills    Requesting Escitalopram 10mg  Filled: 3/7/23 #90 with 0 refills    LOV: 7/16/22 Physical  RTC: 1 year  Last Relevant Labs: 9/1/22    No future appointments.     RXs pended and routed for approval/denial

## 2023-09-05 NOTE — TELEPHONE ENCOUNTER
Requesting Alprazolam 0.5mg  LOV: 7/16/22 Physical  RTC: 1 year  Last Relevant Labs: 9/1/22  Filled: 7/10/23 #30 with 0 refills    No future appointments.     Please schedule patient for appointment, last seen 7/16/22

## 2023-09-07 RX ORDER — ALPRAZOLAM 0.5 MG/1
0.5 TABLET ORAL 2 TIMES DAILY PRN
Qty: 30 TABLET | Refills: 0 | Status: SHIPPED | OUTPATIENT
Start: 2023-09-07

## 2023-09-07 NOTE — TELEPHONE ENCOUNTER
Future Appointments   Date Time Provider Roque Lux   10/18/2023 11:00 AM Naima Roth MD [facility] [address]        Pt only has 3-4 left of this medication. Please refill until he can come in for his appt.

## 2023-09-21 ENCOUNTER — APPOINTMENT (OUTPATIENT)
Dept: GENERAL RADIOLOGY | Age: 33
End: 2023-09-21
Attending: EMERGENCY MEDICINE
Payer: OTHER MISCELLANEOUS

## 2023-09-21 ENCOUNTER — HOSPITAL ENCOUNTER (EMERGENCY)
Age: 33
Discharge: HOME | End: 2023-09-21
Attending: EMERGENCY MEDICINE
Payer: OTHER MISCELLANEOUS

## 2023-09-21 VITALS
SYSTOLIC BLOOD PRESSURE: 146 MMHG | OXYGEN SATURATION: 96 % | HEIGHT: 75 IN | WEIGHT: 190 LBS | DIASTOLIC BLOOD PRESSURE: 90 MMHG | HEART RATE: 68 BPM | TEMPERATURE: 98 F | RESPIRATION RATE: 16 BRPM | BODY MASS INDEX: 23.62 KG/M2

## 2023-09-21 DIAGNOSIS — S46.912A STRAIN OF LEFT SHOULDER, INITIAL ENCOUNTER: Primary | ICD-10-CM

## 2023-09-21 PROCEDURE — 99284 EMERGENCY DEPT VISIT MOD MDM: CPT

## 2023-09-21 PROCEDURE — 73030 X-RAY EXAM OF SHOULDER: CPT | Performed by: EMERGENCY MEDICINE

## 2023-09-21 PROCEDURE — 99283 EMERGENCY DEPT VISIT LOW MDM: CPT

## 2023-09-21 PROCEDURE — 72050 X-RAY EXAM NECK SPINE 4/5VWS: CPT | Performed by: EMERGENCY MEDICINE

## 2023-09-21 RX ORDER — CYCLOBENZAPRINE HCL 10 MG
10 TABLET ORAL 3 TIMES DAILY PRN
Qty: 20 TABLET | Refills: 0 | Status: SHIPPED | OUTPATIENT
Start: 2023-09-21 | End: 2023-09-28

## 2023-09-21 NOTE — DISCHARGE INSTRUCTIONS
600 mg of ibuprofen over-the-counter can be taken every 6 hours as needed for pain. Flexeril may make you drowsy so only take it when you are at home or going to bed. Follow-up with occupational health. Return if any concerns or problems.

## 2023-09-21 NOTE — ED INITIAL ASSESSMENT (HPI)
C/O L SHOULDER PAIN SINCE STARTING A NEW CLEANING SYMPTOMS AT WORK 1 WK AGO- PAIN INCREASING AND NOW HAVE PAIN TO L SIDE OF NECK AND L ARM- STILL WORKING

## 2023-09-22 ENCOUNTER — TELEPHONE (OUTPATIENT)
Dept: FAMILY MEDICINE CLINIC | Facility: CLINIC | Age: 33
End: 2023-09-22

## 2023-09-22 NOTE — TELEPHONE ENCOUNTER
Salvatore Higuera MD       Pt was injured on the job and was seen in the ER. Employer is requesting a Status note from the ER stating that patient is to follow up with pcp to be released to go back to work.     Patient has scheduled ER follow up with pcp office on 09/25/2023

## 2023-09-25 ENCOUNTER — OFFICE VISIT (OUTPATIENT)
Dept: FAMILY MEDICINE CLINIC | Facility: CLINIC | Age: 33
End: 2023-09-25
Payer: OTHER MISCELLANEOUS

## 2023-09-25 VITALS
WEIGHT: 258 LBS | DIASTOLIC BLOOD PRESSURE: 86 MMHG | HEIGHT: 75 IN | SYSTOLIC BLOOD PRESSURE: 132 MMHG | TEMPERATURE: 98 F | RESPIRATION RATE: 16 BRPM | BODY MASS INDEX: 32.08 KG/M2 | HEART RATE: 72 BPM

## 2023-09-25 DIAGNOSIS — M54.2 NECK PAIN: Primary | ICD-10-CM

## 2023-09-25 DIAGNOSIS — M25.512 ACUTE PAIN OF LEFT SHOULDER: ICD-10-CM

## 2023-09-25 PROCEDURE — 99213 OFFICE O/P EST LOW 20 MIN: CPT | Performed by: FAMILY MEDICINE

## 2023-09-25 PROCEDURE — 3079F DIAST BP 80-89 MM HG: CPT | Performed by: FAMILY MEDICINE

## 2023-09-25 PROCEDURE — 3008F BODY MASS INDEX DOCD: CPT | Performed by: FAMILY MEDICINE

## 2023-09-25 PROCEDURE — 3075F SYST BP GE 130 - 139MM HG: CPT | Performed by: FAMILY MEDICINE

## 2023-10-07 DIAGNOSIS — Z00.00 ROUTINE ADULT HEALTH MAINTENANCE: ICD-10-CM

## 2023-10-10 RX ORDER — ESCITALOPRAM OXALATE 10 MG/1
10 TABLET ORAL DAILY
Qty: 90 TABLET | Refills: 0 | Status: SHIPPED | OUTPATIENT
Start: 2023-10-10

## 2023-10-10 NOTE — TELEPHONE ENCOUNTER
Requested Renewals     Name from pharmacy: Escitalopram Oxalate 10 Mg Tab Auro         Will file in chart as: ESCITALOPRAM 10 MG Oral Tab    Sig: TAKE ONE TABLET BY MOUTH ONE TIME DAILY    Disp: 90 tablet    Refills: 0    Start: 10/7/2023    Class: Normal    Non-formulary         Future Appointments   Date Time Provider Roque Lux   10/18/2023 11:00 AM Mukund Cadena MD [facility] [address]     LOV: 9/25/23  Last physical done 7/16/22    -medication pended for review

## 2023-10-18 ENCOUNTER — OFFICE VISIT (OUTPATIENT)
Dept: FAMILY MEDICINE CLINIC | Facility: CLINIC | Age: 33
End: 2023-10-18
Payer: COMMERCIAL

## 2023-10-18 VITALS
TEMPERATURE: 98 F | HEIGHT: 75 IN | BODY MASS INDEX: 31.46 KG/M2 | DIASTOLIC BLOOD PRESSURE: 72 MMHG | SYSTOLIC BLOOD PRESSURE: 120 MMHG | WEIGHT: 253 LBS | OXYGEN SATURATION: 97 % | HEART RATE: 65 BPM | RESPIRATION RATE: 20 BRPM

## 2023-10-18 DIAGNOSIS — Z00.00 WELL ADULT EXAM: ICD-10-CM

## 2023-10-18 DIAGNOSIS — E55.9 VITAMIN D DEFICIENCY: Primary | ICD-10-CM

## 2023-10-18 DIAGNOSIS — Z00.00 ROUTINE ADULT HEALTH MAINTENANCE: ICD-10-CM

## 2023-10-18 PROCEDURE — 99395 PREV VISIT EST AGE 18-39: CPT | Performed by: FAMILY MEDICINE

## 2023-10-18 PROCEDURE — 3078F DIAST BP <80 MM HG: CPT | Performed by: FAMILY MEDICINE

## 2023-10-18 PROCEDURE — 3074F SYST BP LT 130 MM HG: CPT | Performed by: FAMILY MEDICINE

## 2023-10-18 PROCEDURE — 3008F BODY MASS INDEX DOCD: CPT | Performed by: FAMILY MEDICINE

## 2023-10-19 ENCOUNTER — TELEPHONE (OUTPATIENT)
Dept: FAMILY MEDICINE CLINIC | Facility: CLINIC | Age: 33
End: 2023-10-19

## 2023-10-19 ENCOUNTER — LAB ENCOUNTER (OUTPATIENT)
Dept: LAB | Age: 33
End: 2023-10-19
Attending: FAMILY MEDICINE
Payer: COMMERCIAL

## 2023-10-19 DIAGNOSIS — Z00.00 LABORATORY EXAMINATION ORDERED AS PART OF A ROUTINE GENERAL MEDICAL EXAMINATION: ICD-10-CM

## 2023-10-19 DIAGNOSIS — E55.9 VITAMIN D DEFICIENCY: ICD-10-CM

## 2023-10-19 DIAGNOSIS — E55.9 VITAMIN D DEFICIENCY: Primary | ICD-10-CM

## 2023-10-19 LAB
ALBUMIN SERPL-MCNC: 4.2 G/DL (ref 3.4–5)
ALBUMIN/GLOB SERPL: 1.1 {RATIO} (ref 1–2)
ALP LIVER SERPL-CCNC: 65 U/L
ALT SERPL-CCNC: 28 U/L
ANION GAP SERPL CALC-SCNC: 4 MMOL/L (ref 0–18)
AST SERPL-CCNC: 25 U/L (ref 15–37)
BASOPHILS # BLD AUTO: 0.03 X10(3) UL (ref 0–0.2)
BASOPHILS NFR BLD AUTO: 0.6 %
BILIRUB SERPL-MCNC: 0.7 MG/DL (ref 0.1–2)
BUN BLD-MCNC: 17 MG/DL (ref 7–18)
CALCIUM BLD-MCNC: 9.4 MG/DL (ref 8.5–10.1)
CHLORIDE SERPL-SCNC: 106 MMOL/L (ref 98–112)
CHOLEST SERPL-MCNC: 179 MG/DL (ref ?–200)
CO2 SERPL-SCNC: 29 MMOL/L (ref 21–32)
CREAT BLD-MCNC: 0.92 MG/DL
EGFRCR SERPLBLD CKD-EPI 2021: 112 ML/MIN/1.73M2 (ref 60–?)
EOSINOPHIL # BLD AUTO: 0.36 X10(3) UL (ref 0–0.7)
EOSINOPHIL NFR BLD AUTO: 6.8 %
ERYTHROCYTE [DISTWIDTH] IN BLOOD BY AUTOMATED COUNT: 13.2 %
FASTING PATIENT LIPID ANSWER: YES
FASTING STATUS PATIENT QL REPORTED: YES
GLOBULIN PLAS-MCNC: 3.7 G/DL (ref 2.8–4.4)
GLUCOSE BLD-MCNC: 86 MG/DL (ref 70–99)
HCT VFR BLD AUTO: 42.7 %
HDLC SERPL-MCNC: 75 MG/DL (ref 40–59)
HGB BLD-MCNC: 14.3 G/DL
IMM GRANULOCYTES # BLD AUTO: 0.02 X10(3) UL (ref 0–1)
IMM GRANULOCYTES NFR BLD: 0.4 %
LDLC SERPL CALC-MCNC: 95 MG/DL (ref ?–100)
LYMPHOCYTES # BLD AUTO: 1.66 X10(3) UL (ref 1–4)
LYMPHOCYTES NFR BLD AUTO: 31.5 %
MCH RBC QN AUTO: 29.2 PG (ref 26–34)
MCHC RBC AUTO-ENTMCNC: 33.5 G/DL (ref 31–37)
MCV RBC AUTO: 87.1 FL
MONOCYTES # BLD AUTO: 0.47 X10(3) UL (ref 0.1–1)
MONOCYTES NFR BLD AUTO: 8.9 %
NEUTROPHILS # BLD AUTO: 2.73 X10 (3) UL (ref 1.5–7.7)
NEUTROPHILS # BLD AUTO: 2.73 X10(3) UL (ref 1.5–7.7)
NEUTROPHILS NFR BLD AUTO: 51.8 %
NONHDLC SERPL-MCNC: 104 MG/DL (ref ?–130)
OSMOLALITY SERPL CALC.SUM OF ELEC: 289 MOSM/KG (ref 275–295)
PLATELET # BLD AUTO: 202 10(3)UL (ref 150–450)
POTASSIUM SERPL-SCNC: 4.1 MMOL/L (ref 3.5–5.1)
PROT SERPL-MCNC: 7.9 G/DL (ref 6.4–8.2)
RBC # BLD AUTO: 4.9 X10(6)UL
SODIUM SERPL-SCNC: 139 MMOL/L (ref 136–145)
T4 FREE SERPL-MCNC: 0.9 NG/DL (ref 0.8–1.7)
TRIGL SERPL-MCNC: 47 MG/DL (ref 30–149)
TSI SER-ACNC: 1.19 MIU/ML (ref 0.36–3.74)
VIT D+METAB SERPL-MCNC: 30.8 NG/ML (ref 30–100)
VLDLC SERPL CALC-MCNC: 8 MG/DL (ref 0–30)
WBC # BLD AUTO: 5.3 X10(3) UL (ref 4–11)

## 2023-10-19 PROCEDURE — 80053 COMPREHEN METABOLIC PANEL: CPT

## 2023-10-19 PROCEDURE — 82306 VITAMIN D 25 HYDROXY: CPT

## 2023-10-19 PROCEDURE — 36415 COLL VENOUS BLD VENIPUNCTURE: CPT

## 2023-10-19 PROCEDURE — 84443 ASSAY THYROID STIM HORMONE: CPT

## 2023-10-19 PROCEDURE — 85025 COMPLETE CBC W/AUTO DIFF WBC: CPT

## 2023-10-19 PROCEDURE — 80061 LIPID PANEL: CPT

## 2023-10-19 PROCEDURE — 84439 ASSAY OF FREE THYROXINE: CPT

## 2023-10-19 RX ORDER — ALPRAZOLAM 0.5 MG/1
0.5 TABLET ORAL 2 TIMES DAILY PRN
Qty: 30 TABLET | Refills: 0 | Status: SHIPPED | OUTPATIENT
Start: 2023-10-19

## 2024-01-08 DIAGNOSIS — Z00.00 ROUTINE ADULT HEALTH MAINTENANCE: ICD-10-CM

## 2024-01-09 RX ORDER — ESCITALOPRAM OXALATE 10 MG/1
10 TABLET ORAL DAILY
Qty: 90 TABLET | Refills: 0 | Status: SHIPPED | OUTPATIENT
Start: 2024-01-09

## 2024-01-09 NOTE — TELEPHONE ENCOUNTER
Requesting Escitalopram 10mg  Last OV: 10/18/23 Physical  RTC: 1 year  Last Rx'd 10/10/23 #90 with 0 refills    No future appointments.    Non-protocol med:  Rx pended and routed for approval/denial

## 2024-03-02 DIAGNOSIS — F41.1 GENERALIZED ANXIETY DISORDER: ICD-10-CM

## 2024-03-04 RX ORDER — ALPRAZOLAM 0.5 MG/1
0.5 TABLET ORAL 2 TIMES DAILY PRN
Qty: 30 TABLET | Refills: 0 | Status: SHIPPED | OUTPATIENT
Start: 2024-03-04

## 2024-03-04 NOTE — TELEPHONE ENCOUNTER
Patient is requesting a refill on: Alprazolam 0.5 mg # 30  Last LOV: 10/18/23  Last Refill: 1/9/24    Non- protocol Medication  RX pended and routed to provider for approval

## 2024-04-09 DIAGNOSIS — Z00.00 ROUTINE ADULT HEALTH MAINTENANCE: ICD-10-CM

## 2024-04-09 RX ORDER — ESCITALOPRAM OXALATE 10 MG/1
10 TABLET ORAL DAILY
Qty: 90 TABLET | Refills: 0 | Status: SHIPPED | OUTPATIENT
Start: 2024-04-09

## 2024-04-26 DIAGNOSIS — F41.1 GENERALIZED ANXIETY DISORDER: ICD-10-CM

## 2024-04-29 RX ORDER — ALPRAZOLAM 0.5 MG/1
0.5 TABLET ORAL 2 TIMES DAILY PRN
Qty: 30 TABLET | Refills: 0 | Status: SHIPPED | OUTPATIENT
Start: 2024-04-29

## 2024-04-29 NOTE — TELEPHONE ENCOUNTER
Requesting Alprazolam 0.5mg  Last OV: 10/18/23 Physical  RTC: 1 year  Last Rx'd 3/4/24 #30 with 0 refills    No future appointments.    [place] , last [place] 3/4/24 #30    Controlled med:  Rx pended and routed for approval/denial

## 2024-06-08 PROBLEM — R10.32 LEFT INGUINAL PAIN: Status: RESOLVED | Noted: 2020-02-26 | Resolved: 2024-06-08

## 2024-06-08 PROBLEM — I88.9 INGUINAL LYMPHADENITIS: Status: RESOLVED | Noted: 2018-04-26 | Resolved: 2024-06-08

## 2024-06-08 PROBLEM — N50.819 TESTICULAR PAIN: Status: RESOLVED | Noted: 2020-02-26 | Resolved: 2024-06-08

## 2024-06-08 PROBLEM — S76.212A STRAIN OF GROIN, LEFT, INITIAL ENCOUNTER: Status: RESOLVED | Noted: 2018-01-15 | Resolved: 2024-06-08

## 2024-06-08 PROBLEM — B07.8 OTHER VIRAL WARTS: Status: RESOLVED | Noted: 2021-08-31 | Resolved: 2024-06-08

## 2024-06-08 PROBLEM — Z79.01 ON CONTINUOUS ORAL ANTICOAGULATION: Status: RESOLVED | Noted: 2017-05-15 | Resolved: 2024-06-08

## 2024-06-08 PROBLEM — I82.419 DVT OF DEEP FEMORAL VEIN (HCC): Status: RESOLVED | Noted: 2017-06-12 | Resolved: 2024-06-08

## 2024-06-08 PROBLEM — K64.8 INTERNAL HEMORRHOID: Status: RESOLVED | Noted: 2017-05-15 | Resolved: 2024-06-08

## 2024-07-22 ENCOUNTER — OFFICE VISIT (OUTPATIENT)
Dept: FAMILY MEDICINE CLINIC | Facility: CLINIC | Age: 34
End: 2024-07-22
Payer: COMMERCIAL

## 2024-07-22 VITALS
DIASTOLIC BLOOD PRESSURE: 84 MMHG | WEIGHT: 255 LBS | HEART RATE: 87 BPM | HEIGHT: 75 IN | OXYGEN SATURATION: 98 % | SYSTOLIC BLOOD PRESSURE: 122 MMHG | BODY MASS INDEX: 31.71 KG/M2

## 2024-07-22 DIAGNOSIS — N23 RENAL COLIC: Primary | ICD-10-CM

## 2024-07-22 LAB
BILIRUBIN: NEGATIVE
GLUCOSE (URINE DIPSTICK): NEGATIVE MG/DL
KETONES (URINE DIPSTICK): NEGATIVE MG/DL
LEUKOCYTES: NEGATIVE
MULTISTIX LOT#: ABNORMAL NUMERIC
NITRITE, URINE: NEGATIVE
OCCULT BLOOD: NEGATIVE
PH, URINE: 7 (ref 4.5–8)
PROTEIN (URINE DIPSTICK): NEGATIVE MG/DL
SPECIFIC GRAVITY: 1.01 (ref 1–1.03)
UROBILINOGEN,SEMI-QN: 1 MG/DL (ref 0–1.9)

## 2024-07-22 PROCEDURE — 87086 URINE CULTURE/COLONY COUNT: CPT | Performed by: FAMILY MEDICINE

## 2024-07-22 RX ORDER — TAMSULOSIN HYDROCHLORIDE 0.4 MG/1
0.4 CAPSULE ORAL DAILY
Qty: 15 CAPSULE | Refills: 0 | Status: SHIPPED | OUTPATIENT
Start: 2024-07-22

## 2024-07-22 NOTE — PROGRESS NOTES
[facility] Family Medicine Office Note  Chief Complaint:   Chief Complaint   Patient presents with    Physical     Patient think he might be getting kidney stones  Pain in testicles mostly left side   Pain in the lower back left side        HPI:   This is a 34 year old male coming in for    Reports since last Thursday he has been having L back that started radiating to flank/L pelvic area. No provoking injury or trauma. Has been noticing urinary urgency. He feels burning, pinching sensation at his urethra. Feels blockage sensation while trying to urinate. He reports decreased urine. He has noticed any hematuria. He reports urine is malodorous and also reports urine appears cloudy. No fevers. No new sexual partners. Denies any prior history of kidney stones. Has been alternating tylenol/ibuprofen which has been giving him some relief.     Past Medical History:    Anxiety    Chlamydia contact, treated    Depression    DVT (deep venous thrombosis) (HCC)    Left leg    DVT of deep femoral vein (HCC)    Keloid scar    Mitral valve prolapse    Varicose vein     Past Surgical History:   Procedure Laterality Date    Ablation      Other      varicose removal to lt leg    Other surgical history      vein removal     Social History:  Social History     Socioeconomic History    Marital status: Single    Number of children: 1   Tobacco Use    Smoking status: Former     Current packs/day: 0.00     Types: Cigarettes     Quit date: 2011     Years since quittin.7     Passive exposure: Never    Smokeless tobacco: Never   Vaping Use    Vaping status: Never Used   Substance and Sexual Activity    Alcohol use: Not Currently     Alcohol/week: 1.0 - 2.0 standard drink of alcohol     Types: 1 - 2 Glasses of wine per week     Comment: Cage done 10/08/2018, 1-2 drinks/week    Drug use: Never    Sexual activity: Yes     Partners: Female   Other Topics Concern     Service No    Blood Transfusions No    Caffeine  Concern No    Occupational Exposure No    Hobby Hazards No    Sleep Concern No    Stress Concern Yes    Weight Concern No    Special Diet No    Back Care No    Exercise Yes    Bike Helmet No    Seat Belt Yes    Self-Exams No     Family History:  Family History   Problem Relation Age of Onset    Heart Disorder Father         mitral valve repair    Lipids Father     Hypertension Mother     Other (Pancreatic Cancer) Maternal Grandmother         pancreatic    Heart Disorder Maternal Grandfather     Hypertension Maternal Grandfather     Heart Disorder Paternal Grandmother     Heart Disorder Paternal Grandfather      Allergies:  No Known Allergies  Current Meds:  Current Outpatient Medications   Medication Sig Dispense Refill    tamsulosin 0.4 MG Oral Cap Take 1 capsule (0.4 mg total) by mouth daily. 15 capsule 0    ALPRAZolam 0.5 MG Oral Tab Take 1 tablet (0.5 mg total) by mouth 2 (two) times daily as needed. 60 tablet 5    escitalopram 10 MG Oral Tab Take 1 tablet (10 mg total) by mouth daily. 90 tablet 1    Multiple Vitamins-Minerals (MULTI-VITAMIN/MINERALS) Oral Tab Take 1 tablet by mouth daily.        Counseling given: Not Answered       REVIEW OF SYSTEMS:   Review of Systems   A comprehensive 10 point review of systems was completed.  Pertinent positives and negatives noted in the the HPI.    EXAM:   /84   Pulse 87   Ht 6' 3\" (1.905 m)   Wt 255 lb (115.7 kg)   SpO2 98%   BMI 31.87 kg/m²  Estimated body mass index is 31.87 kg/m² as calculated from the following:    Height as of this encounter: 6' 3\" (1.905 m).    Weight as of this encounter: 255 lb (115.7 kg).   Vital signs reviewed.Appears stated age, well groomed.  Physical Exam  Constitutional:       Appearance: Normal appearance.   HENT:      Head: Normocephalic and atraumatic.   Eyes:      Pupils: Pupils are equal, round, and reactive to light.   Cardiovascular:      Rate and Rhythm: Normal rate and regular rhythm.      Pulses: Normal pulses.       Heart sounds: Normal heart sounds. No murmur heard.  Pulmonary:      Effort: Pulmonary effort is normal. No respiratory distress.      Breath sounds: Normal breath sounds. No wheezing or rhonchi.   Abdominal:      General: Abdomen is flat. Bowel sounds are normal. There is no distension.      Palpations: Abdomen is soft. There is no mass.      Tenderness: There is abdominal tenderness (LLQ/L flank tenderness). There is no right CVA tenderness or left CVA tenderness.      Hernia: No hernia is present.   Musculoskeletal:      Right lower leg: No edema.      Left lower leg: No edema.   Skin:     Findings: No rash.   Neurological:      General: No focal deficit present.      Mental Status: He is alert and oriented to person, place, and time.   Psychiatric:         Mood and Affect: Mood normal.          ASSESSMENT AND PLAN:   1. Renal colic  Possible kidney stone. Also can consider UTI, epididymitis.   Will obtain imaging. Urine dip unremarkable, will send for culture. Reviewed conservative mgmt. Advised on pushing fluids, avoid caffeine/alcohol etc. Will trial flomax as well. Can continue tylenol/motrin prn pain. Will follow up once results are in. Reviewed return/ER precautions. F/u 1wk    - Urine Dip, auto without Micro  - CT ABDOMEN+PELVIS KIDNEYSTONE 2D RNDR(NO IV,NO ORAL)(CPT=74176); Future  - tamsulosin 0.4 MG Oral Cap; Take 1 capsule (0.4 mg total) by mouth daily.  Dispense: 15 capsule; Refill: 0  - Urine Culture, Routine; Future  - Urine Culture, Routine      Meds & Refills for this Visit:  Requested Prescriptions     Signed Prescriptions Disp Refills    tamsulosin 0.4 MG Oral Cap 15 capsule 0     Sig: Take 1 capsule (0.4 mg total) by mouth daily.       Health Maintenance:  Health Maintenance Due   Topic Date Due    DTaP,Tdap,and Td Vaccines (1 - Tdap) Never done    Annual Physical  10/18/2024       Patient/Caregiver Education: Patient/Caregiver Education: There are no barriers to learning. Medical education  done.   Outcome: Patient verbalizes understanding. Patient is notified to call with any questions, complications, allergies, or worsening or changing symptoms.  Patient is to call with any side effects or complications from the treatments as a result of today.     Problem List:  Patient Active Problem List   Diagnosis    Unspecified vitamin D deficiency    Saphenous vein ablation    Pulmonary nodule right per CT     Varicose vein of leg    Mitral valve prolapse    Depression    Generalized anxiety disorder    Gas pain    Change in bowel habits    Family history of cardiovascular disease    Non-rheumatic mitral regurgitation    Generalized abdominal pain

## 2024-07-28 ENCOUNTER — HOSPITAL ENCOUNTER (OUTPATIENT)
Dept: CT IMAGING | Age: 34
Discharge: HOME | End: 2024-07-28
Attending: FAMILY MEDICINE
Payer: COMMERCIAL

## 2024-07-28 ENCOUNTER — HOSPITAL ENCOUNTER (OUTPATIENT)
Dept: CT IMAGING | Age: 34
End: 2024-07-28
Attending: FAMILY MEDICINE
Payer: COMMERCIAL

## 2024-07-28 DIAGNOSIS — N23 RENAL COLIC: ICD-10-CM

## 2024-07-28 PROCEDURE — 74176 CT ABD & PELVIS W/O CONTRAST: CPT | Performed by: FAMILY MEDICINE

## 2024-07-29 ENCOUNTER — TELEPHONE (OUTPATIENT)
Dept: FAMILY MEDICINE CLINIC | Facility: CLINIC | Age: 34
End: 2024-07-29

## 2024-07-29 NOTE — TELEPHONE ENCOUNTER
Patient called asking for his CT results. He said it saw he has a hernia. I explained that the Dr will still need to review it and will get back to him. Informed him the Dr is not here tomorrow, but we will get back with him as soon as we can.

## 2024-07-29 NOTE — TELEPHONE ENCOUNTER
----- Message from Greg Henderson sent at 7/29/2024  9:38 AM CDT -----  CT scan negative, no kidney stone. No signs of any infection. He has small b/l inguinal hernia but I don't think they are contributing to symptoms. He does have some retained stool, could be 2/2 constipation. Please get condition update.

## 2024-07-29 NOTE — TELEPHONE ENCOUNTER
There is nothing you have to modify but would recommend to use correct form when lifting/pushing/pulling. Again these were small fat containing hernias which are common. If you do have any symptoms (groin bulge/swelling, pain in groin) then we can have you see a surgeon however you aren't having any of these symptoms so we can just monitor for now.

## 2024-07-29 NOTE — TELEPHONE ENCOUNTER
Called pt and informed him of results. Pt states pain is better. Does not feel constipated. Pt asking if needs surgery for hernia. Please advise.

## 2024-09-23 ENCOUNTER — OFFICE VISIT (OUTPATIENT)
Dept: FAMILY MEDICINE CLINIC | Facility: CLINIC | Age: 34
End: 2024-09-23
Payer: COMMERCIAL

## 2024-09-23 VITALS
BODY MASS INDEX: 31.71 KG/M2 | HEART RATE: 77 BPM | SYSTOLIC BLOOD PRESSURE: 132 MMHG | OXYGEN SATURATION: 96 % | HEIGHT: 75 IN | WEIGHT: 255 LBS | RESPIRATION RATE: 18 BRPM | DIASTOLIC BLOOD PRESSURE: 84 MMHG

## 2024-09-23 DIAGNOSIS — I83.90 VARICOSE VEIN: ICD-10-CM

## 2024-09-23 DIAGNOSIS — N50.89 SCROTAL MASS: Primary | ICD-10-CM

## 2024-09-23 PROCEDURE — 99213 OFFICE O/P EST LOW 20 MIN: CPT | Performed by: FAMILY MEDICINE

## 2024-09-23 PROCEDURE — 3008F BODY MASS INDEX DOCD: CPT | Performed by: FAMILY MEDICINE

## 2024-09-23 PROCEDURE — 3075F SYST BP GE 130 - 139MM HG: CPT | Performed by: FAMILY MEDICINE

## 2024-09-23 PROCEDURE — 3079F DIAST BP 80-89 MM HG: CPT | Performed by: FAMILY MEDICINE

## 2024-09-23 NOTE — PROGRESS NOTES
[facility] Family Medicine Office Note  Chief Complaint:   Chief Complaint   Patient presents with    Lump     Noticed the lump 2 days ago.Right testicle, not painful    Varicose Veins     Bilateral legs        HPI:   This is a 34 year old male coming in for    Scrotal lump - noticed in 2 days ago. He has h/o varicoceles, L epididymal head cyst. Denies any scrotal pain/swelling. Denies any trauma. Denies any penile discharge. No other associated symptoms.     He has h/o varicose veins. He has previously seen Dr Huggins for them. He would like to see him again for this. He reports discomfort.     Past Medical History:    Anxiety    Chlamydia contact, treated    Depression    DVT (deep venous thrombosis) (HCC)    Left leg    DVT of deep femoral vein (HCC)    Keloid scar    Mitral valve prolapse    Varicose vein     Past Surgical History:   Procedure Laterality Date    Ablation      Other      varicose removal to lt leg    Other surgical history      vein removal     Social History:  Social History     Socioeconomic History    Marital status: Single    Number of children: 1   Tobacco Use    Smoking status: Former     Current packs/day: 0.00     Types: Cigarettes     Quit date: 2011     Years since quittin.8     Passive exposure: Never    Smokeless tobacco: Never   Vaping Use    Vaping status: Never Used   Substance and Sexual Activity    Alcohol use: Not Currently     Alcohol/week: 1.0 - 2.0 standard drink of alcohol     Types: 1 - 2 Glasses of wine per week     Comment: Cage done 10/08/2018, 1-2 drinks/week    Drug use: Never    Sexual activity: Yes     Partners: Female   Other Topics Concern     Service No    Blood Transfusions No    Caffeine Concern No    Occupational Exposure No    Hobby Hazards No    Sleep Concern No    Stress Concern Yes    Weight Concern No    Special Diet No    Back Care No    Exercise Yes    Bike Helmet No    Seat Belt Yes    Self-Exams No     Family  History:  Family History   Problem Relation Age of Onset    Heart Disorder Father         mitral valve repair    Lipids Father     Hypertension Mother     Other (Pancreatic Cancer) Maternal Grandmother         pancreatic    Heart Disorder Maternal Grandfather     Hypertension Maternal Grandfather     Heart Disorder Paternal Grandmother     Heart Disorder Paternal Grandfather      Allergies:  No Known Allergies  Current Meds:  Current Outpatient Medications   Medication Sig Dispense Refill    ALPRAZolam 0.5 MG Oral Tab Take 1 tablet (0.5 mg total) by mouth 2 (two) times daily as needed. 60 tablet 5    escitalopram 10 MG Oral Tab Take 1 tablet (10 mg total) by mouth daily. 90 tablet 1    Multiple Vitamins-Minerals (MULTI-VITAMIN/MINERALS) Oral Tab Take 1 tablet by mouth daily.      tamsulosin 0.4 MG Oral Cap Take 1 capsule (0.4 mg total) by mouth daily. (Patient not taking: Reported on 9/23/2024) 15 capsule 0      Counseling given: Not Answered       REVIEW OF SYSTEMS:   Review of Systems   Cardiovascular:         Varicose veins   Genitourinary:         Scrotal mass   All other systems reviewed and are negative.       EXAM:   /84   Pulse 77   Resp 18   Ht 6' 3\" (1.905 m)   Wt 255 lb (115.7 kg)   SpO2 96%   BMI 31.87 kg/m²  Estimated body mass index is 31.87 kg/m² as calculated from the following:    Height as of this encounter: 6' 3\" (1.905 m).    Weight as of this encounter: 255 lb (115.7 kg).   Vital signs reviewed.Appears stated age, well groomed.  Physical Exam  Vitals and nursing note reviewed.   Constitutional:       Appearance: Normal appearance.   HENT:      Head: Normocephalic and atraumatic.   Pulmonary:      Effort: Pulmonary effort is normal.   Genitourinary:     Penis: Normal and circumcised.       Testes:         Right: Mass (soft 1cm mass palpated) present. Tenderness, swelling, testicular hydrocele or varicocele not present. Right testis is descended. Cremasteric reflex is present.           Left: Tenderness present. Mass, swelling, testicular hydrocele or varicocele not present. Left testis is descended. Cremasteric reflex is present.    Skin:     Comments: B/l lower extremity varicose veins   Neurological:      Mental Status: He is alert and oriented to person, place, and time.   Psychiatric:         Mood and Affect: Mood normal.          ASSESSMENT AND PLAN:   1. Scrotal mass  Suspect cyst, will obtain imaging for further evaluation. He is asymptomatic. Will f/u with patient once imaging resulted.   - US SCROTUM W/ DOPPLER (CPT=93975/99679); Future    2. Varicose vein  Refer to vascular for further evaluation.   - Vascular Surgery - In Network      Meds & Refills for this Visit:  Requested Prescriptions      No prescriptions requested or ordered in this encounter       Health Maintenance:  Health Maintenance Due   Topic Date Due    DTaP,Tdap,and Td Vaccines (1 - Tdap) Never done    COVID-19 Vaccine (4 - 2023-24 season) 09/01/2024    Annual Physical  10/18/2024       Patient/Caregiver Education: Patient/Caregiver Education: There are no barriers to learning. Medical education done.   Outcome: Patient verbalizes understanding. Patient is notified to call with any questions, complications, allergies, or worsening or changing symptoms.  Patient is to call with any side effects or complications from the treatments as a result of today.     Problem List:  Patient Active Problem List   Diagnosis    Unspecified vitamin D deficiency    Saphenous vein ablation    Pulmonary nodule right per CT     Varicose vein of leg    Mitral valve prolapse    Depression    Generalized anxiety disorder    Gas pain    Change in bowel habits    Family history of cardiovascular disease    Non-rheumatic mitral regurgitation    Generalized abdominal pain

## 2024-09-25 ENCOUNTER — HOSPITAL ENCOUNTER (OUTPATIENT)
Dept: ULTRASOUND IMAGING | Age: 34
Discharge: HOME | End: 2024-09-25
Attending: FAMILY MEDICINE
Payer: COMMERCIAL

## 2024-09-25 DIAGNOSIS — N50.89 SCROTAL MASS: ICD-10-CM

## 2024-09-25 PROCEDURE — 76870 US EXAM SCROTUM: CPT | Performed by: FAMILY MEDICINE

## 2024-09-25 PROCEDURE — 93975 VASCULAR STUDY: CPT | Performed by: FAMILY MEDICINE

## 2024-09-26 ENCOUNTER — TELEPHONE (OUTPATIENT)
Dept: FAMILY MEDICINE CLINIC | Facility: CLINIC | Age: 34
End: 2024-09-26

## 2024-09-26 DIAGNOSIS — I83.90 VARICOSE VEIN: Primary | ICD-10-CM

## 2024-09-26 NOTE — TELEPHONE ENCOUNTER
Called pt and left vm (okay per HIPAA) of below response from provider. Office number provided so pt can schedule

## 2024-09-26 NOTE — TELEPHONE ENCOUNTER
Patient recently seen for scrotal mass.     Dr. Henderson, pt to see urology? Do not see he has previously been referred.

## 2024-09-26 NOTE — TELEPHONE ENCOUNTER
Juan Magana called requesting a referral  LOV: 9/23/2024      Is this referral for a new concern or existing concern: existing  Has the patient been seen in our office for this concern? yes    Specialty: urology  Practice address: [email or web address]    Practice phone number: [email or web address]  Does patient have a scheduled appointment already with the specialist?: no

## 2024-09-27 NOTE — TELEPHONE ENCOUNTER
Patient following up, requesting new referral for Vascular Surgery, as Dr. Huggins does not do surgery. Has appointment scheduled 10/9 with Dr. Gottlieb with [facility]. New referral pended below. Please advise.     Patient understands he does not need urology referral at this time, he will follow up with provider as instructed 2-3 weeks.

## 2024-10-03 ENCOUNTER — HOSPITAL ENCOUNTER (OUTPATIENT)
Dept: CV DIAGNOSTICS | Facility: HOSPITAL | Age: 34
Discharge: HOME | End: 2024-10-03
Attending: FAMILY MEDICINE
Payer: COMMERCIAL

## 2024-10-03 DIAGNOSIS — I34.1 MITRAL VALVE PROLAPSE: ICD-10-CM

## 2024-10-03 PROCEDURE — 93306 TTE W/DOPPLER COMPLETE: CPT | Performed by: FAMILY MEDICINE

## 2024-10-04 DIAGNOSIS — I07.1 TRICUSPID VALVE INSUFFICIENCY, UNSPECIFIED ETIOLOGY: Primary | ICD-10-CM

## 2024-10-14 ENCOUNTER — OFFICE VISIT (OUTPATIENT)
Dept: FAMILY MEDICINE CLINIC | Facility: CLINIC | Age: 34
End: 2024-10-14
Payer: COMMERCIAL

## 2024-10-14 VITALS
WEIGHT: 253 LBS | DIASTOLIC BLOOD PRESSURE: 68 MMHG | SYSTOLIC BLOOD PRESSURE: 120 MMHG | HEIGHT: 75 IN | HEART RATE: 63 BPM | RESPIRATION RATE: 18 BRPM | BODY MASS INDEX: 31.46 KG/M2 | OXYGEN SATURATION: 98 %

## 2024-10-14 DIAGNOSIS — N50.89 SCROTAL MASS: Primary | ICD-10-CM

## 2024-10-14 DIAGNOSIS — I83.90 VARICOSE VEIN: ICD-10-CM

## 2024-10-14 DIAGNOSIS — I07.1 TRICUSPID VALVE INSUFFICIENCY, UNSPECIFIED ETIOLOGY: ICD-10-CM

## 2024-10-14 DIAGNOSIS — F41.1 GENERALIZED ANXIETY DISORDER: ICD-10-CM

## 2024-10-14 PROCEDURE — 99214 OFFICE O/P EST MOD 30 MIN: CPT | Performed by: FAMILY MEDICINE

## 2024-10-14 PROCEDURE — 3074F SYST BP LT 130 MM HG: CPT | Performed by: FAMILY MEDICINE

## 2024-10-14 PROCEDURE — 3008F BODY MASS INDEX DOCD: CPT | Performed by: FAMILY MEDICINE

## 2024-10-14 PROCEDURE — 3078F DIAST BP <80 MM HG: CPT | Performed by: FAMILY MEDICINE

## 2024-10-14 RX ORDER — ALPRAZOLAM 0.5 MG
0.5 TABLET ORAL 2 TIMES DAILY PRN
Qty: 60 TABLET | Refills: 5 | Status: SHIPPED | OUTPATIENT
Start: 2024-10-14

## 2024-10-14 NOTE — PROGRESS NOTES
[facility] Family Medicine Office Note  Chief Complaint:   Chief Complaint   Patient presents with    Follow - Up     Right scrotal mass, pt states he thinks mass is smaller        HPI:   This is a 34 year old male coming in for follow up:    Scrotal mass - scrotal US showed ill defined area of mildly increased vascularity and mixed echogenicity within subcutaneous tissues of the right side of the scrotum measuring 16 x 8 x 15mm that is non specific. He did notice that it has decreased in size. Denies any new symptoms.     SARAH - stable, needs refill for xanax.    Varicose veins - following with cardiology    Tricuspid regurgitation - has h/o MVP. Echo showed no significant changes from last imaging in 2017. Found to have mild to moderate TR, RV cavity size mildly increased. He is following with cardiology for this.     Past Medical History:    Anxiety    Chlamydia contact, treated    Depression    DVT (deep venous thrombosis) (HCC)    Left leg    DVT of deep femoral vein (HCC)    Keloid scar    Mitral valve prolapse    Varicose vein     Past Surgical History:   Procedure Laterality Date    Ablation      Other      varicose removal to lt leg    Other surgical history      vein removal     Social History:  Social History     Socioeconomic History    Marital status: Single    Number of children: 1   Tobacco Use    Smoking status: Former     Current packs/day: 0.00     Types: Cigarettes     Quit date: 2011     Years since quittin.9     Passive exposure: Never    Smokeless tobacco: Never   Vaping Use    Vaping status: Never Used   Substance and Sexual Activity    Alcohol use: Not Currently     Alcohol/week: 1.0 - 2.0 standard drink of alcohol     Types: 1 - 2 Glasses of wine per week     Comment: Cage done 10/08/2018, 1-2 drinks/week    Drug use: Never    Sexual activity: Yes     Partners: Female   Other Topics Concern     Service No    Blood Transfusions No    Caffeine Concern No     Occupational Exposure No    Hobby Hazards No    Sleep Concern No    Stress Concern Yes    Weight Concern No    Special Diet No    Back Care No    Exercise Yes    Bike Helmet No    Seat Belt Yes    Self-Exams No     Family History:  Family History   Problem Relation Age of Onset    Heart Disorder Father         mitral valve repair    Lipids Father     Hypertension Mother     Other (Pancreatic Cancer) Maternal Grandmother         pancreatic    Heart Disorder Maternal Grandfather     Hypertension Maternal Grandfather     Heart Disorder Paternal Grandmother     Heart Disorder Paternal Grandfather      Allergies:  Allergies[1]  Current Meds:  Current Outpatient Medications   Medication Sig Dispense Refill    ALPRAZolam 0.5 MG Oral Tab Take 1 tablet (0.5 mg total) by mouth 2 (two) times daily as needed. 60 tablet 5    escitalopram 10 MG Oral Tab Take 1 tablet (10 mg total) by mouth daily. 90 tablet 1    Multiple Vitamins-Minerals (MULTI-VITAMIN/MINERALS) Oral Tab Take 1 tablet by mouth daily.      tamsulosin 0.4 MG Oral Cap Take 1 capsule (0.4 mg total) by mouth daily. (Patient not taking: Reported on 10/14/2024) 15 capsule 0      Counseling given: Not Answered       REVIEW OF SYSTEMS:   Review of Systems   Constitutional: Negative.    HENT: Negative.     Eyes: Negative.    Respiratory: Negative.     Cardiovascular: Negative.    Gastrointestinal: Negative.    Endocrine: Negative.    Genitourinary:  Positive for scrotal swelling.   Musculoskeletal: Negative.    Skin: Negative.    Neurological: Negative.    Psychiatric/Behavioral: Negative.          EXAM:   /68   Pulse 63   Resp 18   Ht 6' 3\" (1.905 m)   Wt 253 lb (114.8 kg)   SpO2 98%   BMI 31.62 kg/m²  Estimated body mass index is 31.62 kg/m² as calculated from the following:    Height as of this encounter: 6' 3\" (1.905 m).    Weight as of this encounter: 253 lb (114.8 kg).   Vital signs reviewed.Appears stated age, well groomed.  Physical Exam  Vitals and  nursing note reviewed.   Constitutional:       Appearance: Normal appearance.   HENT:      Head: Normocephalic and atraumatic.   Pulmonary:      Effort: Pulmonary effort is normal.   Genitourinary:     Testes:         Right: Mass present. Tenderness not present.         Left: Mass or tenderness not present.   Neurological:      Mental Status: He is alert and oriented to person, place, and time.   Psychiatric:         Mood and Affect: Mood normal.          ASSESSMENT AND PLAN:   1. Scrotal mass  Unclear of underlying etiology possible ingrown hair as he does shave in area. He has noticed it decrease in size. No new symptoms or features. Discussed with urology, will repeat scrotal US in 3mo; if remains can refer to urology for evaluation. Reviewed return/call back precautions. F/u 3-4mo.     2. Generalized anxiety disorder  Stable ,CPM   - ALPRAZolam 0.5 MG Oral Tab; Take 1 tablet (0.5 mg total) by mouth 2 (two) times daily as needed.  Dispense: 60 tablet; Refill: 5    3. Varicose vein  Following with cardiology.     4. Tricuspid valve insufficiency, unspecified etiology  Following with cardiology, asymptomatic.       Meds & Refills for this Visit:  Requested Prescriptions     Signed Prescriptions Disp Refills    ALPRAZolam 0.5 MG Oral Tab 60 tablet 5     Sig: Take 1 tablet (0.5 mg total) by mouth 2 (two) times daily as needed.       Health Maintenance:  Health Maintenance Due   Topic Date Due    DTaP,Tdap,and Td Vaccines (1 - Tdap) Never done    COVID-19 Vaccine (4 - 2023-24 season) 09/01/2024    Influenza Vaccine (1) 10/01/2024    Annual Physical  10/18/2024       Patient/Caregiver Education: Patient/Caregiver Education: There are no barriers to learning. Medical education done.   Outcome: Patient verbalizes understanding. Patient is notified to call with any questions, complications, allergies, or worsening or changing symptoms.  Patient is to call with any side effects or complications from the treatments as a  result of today.     Problem List:  Patient Active Problem List   Diagnosis    Unspecified vitamin D deficiency    Saphenous vein ablation    Pulmonary nodule right per CT     Varicose vein of leg    Mitral valve prolapse    Depression    Generalized anxiety disorder    Gas pain    Change in bowel habits    Family history of cardiovascular disease    Non-rheumatic mitral regurgitation    Generalized abdominal pain          [1] No Known Allergies

## 2024-10-16 ENCOUNTER — HOSPITAL ENCOUNTER (OUTPATIENT)
Dept: CT IMAGING | Age: 34
Discharge: HOME | End: 2024-10-16
Attending: FAMILY MEDICINE
Payer: COMMERCIAL

## 2024-10-16 DIAGNOSIS — R91.1 PULMONARY NODULE: ICD-10-CM

## 2024-10-16 PROCEDURE — 71250 CT THORAX DX C-: CPT | Performed by: FAMILY MEDICINE

## 2024-12-12 ENCOUNTER — OFFICE VISIT (OUTPATIENT)
Dept: FAMILY MEDICINE CLINIC | Facility: CLINIC | Age: 34
End: 2024-12-12
Payer: COMMERCIAL

## 2024-12-12 VITALS
WEIGHT: 256 LBS | HEIGHT: 75 IN | OXYGEN SATURATION: 98 % | DIASTOLIC BLOOD PRESSURE: 74 MMHG | RESPIRATION RATE: 18 BRPM | HEART RATE: 56 BPM | BODY MASS INDEX: 31.83 KG/M2 | SYSTOLIC BLOOD PRESSURE: 120 MMHG

## 2024-12-12 DIAGNOSIS — R59.1 LYMPHADENOPATHY: ICD-10-CM

## 2024-12-12 DIAGNOSIS — N50.89 SCROTAL MASS: Primary | ICD-10-CM

## 2024-12-12 PROCEDURE — 3078F DIAST BP <80 MM HG: CPT | Performed by: FAMILY MEDICINE

## 2024-12-12 PROCEDURE — 99213 OFFICE O/P EST LOW 20 MIN: CPT | Performed by: FAMILY MEDICINE

## 2024-12-12 PROCEDURE — 3074F SYST BP LT 130 MM HG: CPT | Performed by: FAMILY MEDICINE

## 2024-12-12 PROCEDURE — 3008F BODY MASS INDEX DOCD: CPT | Performed by: FAMILY MEDICINE

## 2024-12-12 RX ORDER — CYST/ALA/Q10/PHOS.SER/DHA/BROC 100-20-50
POWDER (GRAM) ORAL
COMMUNITY
Start: 2024-10-09

## 2024-12-12 NOTE — PROGRESS NOTES
[facility] Family Medicine Office Note  Chief Complaint:   Chief Complaint   Patient presents with    Follow - Up     Swollen lymph node in groin       HPI:   This is a 34 year old male coming in for follow up. Reports that he had LE venous US ordered by cardiology for venous insufficiency - found to have b/l groin lymphadenopathy measuring Right groin lymph node measures 1.8 x 1.2 x 0.8 cm. Left groin lymph node measures 2.6 x 1.7 x 1.2 cm.    He does have a scrotal mass that we are monitoring - he is due for repeat scrotal US later this month. He denies any symptoms. He denies any fevers, chills, unintentional wt changes. Denies any urinary symptoms. Denies any significant scrotal pain, discharge etc.     Past Medical History:    Anxiety    Chlamydia contact, treated    Depression    DVT (deep venous thrombosis) (HCC)    Left leg    DVT of deep femoral vein (HCC)    Keloid scar    Mitral valve prolapse    Varicose vein     Past Surgical History:   Procedure Laterality Date    Ablation      Other      varicose removal to lt leg    Other surgical history      vein removal     Social History:  Social History     Socioeconomic History    Marital status: Single    Number of children: 1   Tobacco Use    Smoking status: Former     Current packs/day: 0.00     Types: Cigarettes     Quit date: 2011     Years since quittin.1     Passive exposure: Never    Smokeless tobacco: Never   Vaping Use    Vaping status: Never Used   Substance and Sexual Activity    Alcohol use: Not Currently     Alcohol/week: 1.0 - 2.0 standard drink of alcohol     Types: 1 - 2 Glasses of wine per week     Comment: Cage done 10/08/2018, 1-2 drinks/week    Drug use: Never    Sexual activity: Yes     Partners: Female   Other Topics Concern     Service No    Blood Transfusions No    Caffeine Concern No    Occupational Exposure No    Hobby Hazards No    Sleep Concern No    Stress Concern Yes    Weight Concern No    Special  Diet No    Back Care No    Exercise Yes    Bike Helmet No    Seat Belt Yes    Self-Exams No     Family History:  Family History   Problem Relation Age of Onset    Heart Disorder Father         mitral valve repair    Lipids Father     Hypertension Mother     Other (Pancreatic Cancer) Maternal Grandmother         pancreatic    Heart Disorder Maternal Grandfather     Hypertension Maternal Grandfather     Heart Disorder Paternal Grandmother     Heart Disorder Paternal Grandfather      Allergies:  Allergies[1]  Current Meds:  Current Outpatient Medications   Medication Sig Dispense Refill    Vitamin D-Vitamin K (K2-D3 10,000) 89673-86 UNIT-MCG Oral Cap vitamin D3 250 mcg (10,000 unit)-vitamin K2 45 mcg capsule, [RxNorm: 0]      ALPRAZolam 0.5 MG Oral Tab Take 1 tablet (0.5 mg total) by mouth 2 (two) times daily as needed. 60 tablet 5    escitalopram 10 MG Oral Tab Take 1 tablet (10 mg total) by mouth daily. 90 tablet 1    Multiple Vitamins-Minerals (MULTI-VITAMIN/MINERALS) Oral Tab Take 1 tablet by mouth daily.        Counseling given: Not Answered       REVIEW OF SYSTEMS:   Review of Systems   Constitutional: Negative.    Genitourinary: Negative.    All other systems reviewed and are negative.       EXAM:   /74   Pulse 56   Resp 18   Ht 6' 3\" (1.905 m)   Wt 256 lb (116.1 kg)   SpO2 98%   BMI 32.00 kg/m²  Estimated body mass index is 32 kg/m² as calculated from the following:    Height as of this encounter: 6' 3\" (1.905 m).    Weight as of this encounter: 256 lb (116.1 kg).   Vital signs reviewed.Appears stated age, well groomed.  Physical Exam  Vitals and nursing note reviewed.   Constitutional:       Appearance: Normal appearance.   HENT:      Head: Normocephalic and atraumatic.   Pulmonary:      Effort: Pulmonary effort is normal.   Skin:     Findings: No rash.   Neurological:      Mental Status: He is alert and oriented to person, place, and time.   Psychiatric:         Mood and Affect: Mood normal.           ASSESSMENT AND PLAN:   1. Scrotal mass  Repeat scrotal US pending for this month. Will add on groin US to assess lymphadenopathy. Consider urology referral pending results. He is asymptomatic and denies any new concerns. Will monitor closely. F/u 3mo for annual physical     2. Lymphadenopathy  See above   - US GROIN BILATERAL (CPT=76882-50); Future      Meds & Refills for this Visit:  Requested Prescriptions      No prescriptions requested or ordered in this encounter       Health Maintenance:  Health Maintenance Due   Topic Date Due    DTaP,Tdap,and Td Vaccines (1 - Tdap) Never done    COVID-19 Vaccine (4 - 2024-25 season) 09/01/2024    Influenza Vaccine (1) 10/01/2024    Annual Physical  10/18/2024       Patient/Caregiver Education: Patient/Caregiver Education: There are no barriers to learning. Medical education done.   Outcome: Patient verbalizes understanding. Patient is notified to call with any questions, complications, allergies, or worsening or changing symptoms.  Patient is to call with any side effects or complications from the treatments as a result of today.     Problem List:  Patient Active Problem List   Diagnosis    Unspecified vitamin D deficiency    Saphenous vein ablation    Pulmonary nodule right per CT     Varicose vein of leg    Mitral valve prolapse    Depression    Generalized anxiety disorder    Gas pain    Change in bowel habits    Family history of cardiovascular disease    Non-rheumatic mitral regurgitation    Generalized abdominal pain          [1] No Known Allergies

## 2024-12-13 ENCOUNTER — LAB ENCOUNTER (OUTPATIENT)
Dept: LAB | Age: 34
End: 2024-12-13
Attending: FAMILY MEDICINE
Payer: COMMERCIAL

## 2024-12-13 DIAGNOSIS — E55.9 VITAMIN D DEFICIENCY: ICD-10-CM

## 2024-12-13 DIAGNOSIS — Z00.00 LABORATORY EXAM ORDERED AS PART OF ROUTINE GENERAL MEDICAL EXAMINATION: ICD-10-CM

## 2024-12-13 LAB
ALBUMIN SERPL-MCNC: 4.2 G/DL (ref 3.2–4.8)
ALBUMIN/GLOB SERPL: 1.4 {RATIO} (ref 1–2)
ALP LIVER SERPL-CCNC: 57 U/L
ALT SERPL-CCNC: 16 U/L
ANION GAP SERPL CALC-SCNC: 5 MMOL/L (ref 0–18)
AST SERPL-CCNC: 22 U/L (ref ?–34)
BASOPHILS # BLD AUTO: 0.05 X10(3) UL (ref 0–0.2)
BASOPHILS NFR BLD AUTO: 0.8 %
BILIRUB SERPL-MCNC: 0.5 MG/DL (ref 0.3–1.2)
BUN BLD-MCNC: 16 MG/DL (ref 9–23)
CALCIUM BLD-MCNC: 9.6 MG/DL (ref 8.7–10.4)
CHLORIDE SERPL-SCNC: 110 MMOL/L (ref 98–112)
CHOLEST SERPL-MCNC: 149 MG/DL (ref ?–200)
CO2 SERPL-SCNC: 27 MMOL/L (ref 21–32)
CREAT BLD-MCNC: 0.87 MG/DL
EGFRCR SERPLBLD CKD-EPI 2021: 115 ML/MIN/1.73M2 (ref 60–?)
EOSINOPHIL # BLD AUTO: 0.66 X10(3) UL (ref 0–0.7)
EOSINOPHIL NFR BLD AUTO: 10.2 %
ERYTHROCYTE [DISTWIDTH] IN BLOOD BY AUTOMATED COUNT: 13.5 %
EST. AVERAGE GLUCOSE BLD GHB EST-MCNC: 105 MG/DL (ref 68–126)
FASTING PATIENT LIPID ANSWER: YES
FASTING STATUS PATIENT QL REPORTED: YES
GLOBULIN PLAS-MCNC: 3 G/DL (ref 2–3.5)
GLUCOSE BLD-MCNC: 81 MG/DL (ref 70–99)
HBA1C MFR BLD: 5.3 % (ref ?–5.7)
HCT VFR BLD AUTO: 40.6 %
HDLC SERPL-MCNC: 72 MG/DL (ref 40–59)
HGB BLD-MCNC: 13.7 G/DL
IMM GRANULOCYTES # BLD AUTO: 0.03 X10(3) UL (ref 0–1)
IMM GRANULOCYTES NFR BLD: 0.5 %
LDLC SERPL CALC-MCNC: 62 MG/DL (ref ?–100)
LYMPHOCYTES # BLD AUTO: 2.07 X10(3) UL (ref 1–4)
LYMPHOCYTES NFR BLD AUTO: 32.1 %
MCH RBC QN AUTO: 29.7 PG (ref 26–34)
MCHC RBC AUTO-ENTMCNC: 33.7 G/DL (ref 31–37)
MCV RBC AUTO: 87.9 FL
MONOCYTES # BLD AUTO: 0.5 X10(3) UL (ref 0.1–1)
MONOCYTES NFR BLD AUTO: 7.8 %
NEUTROPHILS # BLD AUTO: 3.14 X10 (3) UL (ref 1.5–7.7)
NEUTROPHILS # BLD AUTO: 3.14 X10(3) UL (ref 1.5–7.7)
NEUTROPHILS NFR BLD AUTO: 48.6 %
NONHDLC SERPL-MCNC: 77 MG/DL (ref ?–130)
OSMOLALITY SERPL CALC.SUM OF ELEC: 294 MOSM/KG (ref 275–295)
PLATELET # BLD AUTO: 205 10(3)UL (ref 150–450)
POTASSIUM SERPL-SCNC: 4.4 MMOL/L (ref 3.5–5.1)
PROT SERPL-MCNC: 7.2 G/DL (ref 5.7–8.2)
RBC # BLD AUTO: 4.62 X10(6)UL
SODIUM SERPL-SCNC: 142 MMOL/L (ref 136–145)
TRIGL SERPL-MCNC: 76 MG/DL (ref 30–149)
TSI SER-ACNC: 1.52 UIU/ML (ref 0.55–4.78)
VIT D+METAB SERPL-MCNC: 34.8 NG/ML (ref 30–100)
VLDLC SERPL CALC-MCNC: 11 MG/DL (ref 0–30)
WBC # BLD AUTO: 6.5 X10(3) UL (ref 4–11)

## 2024-12-13 PROCEDURE — 84443 ASSAY THYROID STIM HORMONE: CPT

## 2024-12-13 PROCEDURE — 80053 COMPREHEN METABOLIC PANEL: CPT

## 2024-12-13 PROCEDURE — 85025 COMPLETE CBC W/AUTO DIFF WBC: CPT

## 2024-12-13 PROCEDURE — 83036 HEMOGLOBIN GLYCOSYLATED A1C: CPT

## 2024-12-13 PROCEDURE — 82306 VITAMIN D 25 HYDROXY: CPT

## 2024-12-13 PROCEDURE — 36415 COLL VENOUS BLD VENIPUNCTURE: CPT

## 2024-12-13 PROCEDURE — 80061 LIPID PANEL: CPT

## 2025-01-04 DIAGNOSIS — F41.1 GENERALIZED ANXIETY DISORDER: ICD-10-CM

## 2025-01-04 RX ORDER — ESCITALOPRAM OXALATE 10 MG/1
10 TABLET ORAL DAILY
Qty: 90 TABLET | Refills: 0 | Status: SHIPPED | OUTPATIENT
Start: 2025-01-04

## 2025-01-09 ENCOUNTER — HOSPITAL ENCOUNTER (OUTPATIENT)
Dept: ULTRASOUND IMAGING | Age: 35
Discharge: HOME | End: 2025-01-09
Attending: FAMILY MEDICINE
Payer: COMMERCIAL

## 2025-01-09 DIAGNOSIS — R59.1 LYMPHADENOPATHY: ICD-10-CM

## 2025-01-09 PROCEDURE — 76857 US EXAM PELVIC LIMITED: CPT | Performed by: FAMILY MEDICINE

## 2025-02-06 ENCOUNTER — HOSPITAL ENCOUNTER (OUTPATIENT)
Dept: ULTRASOUND IMAGING | Age: 35
Discharge: HOME | End: 2025-02-06
Attending: FAMILY MEDICINE
Payer: COMMERCIAL

## 2025-02-06 DIAGNOSIS — N50.89 SCROTAL MASS: ICD-10-CM

## 2025-02-06 PROCEDURE — 93975 VASCULAR STUDY: CPT | Performed by: FAMILY MEDICINE

## 2025-02-06 PROCEDURE — 76870 US EXAM SCROTUM: CPT | Performed by: FAMILY MEDICINE

## 2025-04-04 DIAGNOSIS — F41.1 GENERALIZED ANXIETY DISORDER: ICD-10-CM

## 2025-04-08 RX ORDER — ESCITALOPRAM OXALATE 10 MG/1
10 TABLET ORAL DAILY
Qty: 90 TABLET | Refills: 3 | Status: SHIPPED | OUTPATIENT
Start: 2025-04-08

## 2025-04-08 NOTE — TELEPHONE ENCOUNTER
Refill passed per [facility] protocols.    Requested Prescriptions   Pending Prescriptions Disp Refills    ESCITALOPRAM 10 MG Oral Tab [Pharmacy Med Name: Escitalopram Oxalate 10 Mg Tab Auro] 90 tablet 3     Sig: TAKE ONE TABLET BY MOUTH DAILY       Psychiatric Non-Scheduled (Anti-Anxiety) Passed - 4/8/2025 10:17 AM

## 2025-05-08 ENCOUNTER — OFFICE VISIT (OUTPATIENT)
Dept: FAMILY MEDICINE CLINIC | Facility: CLINIC | Age: 35
End: 2025-05-08
Payer: COMMERCIAL

## 2025-05-08 VITALS
HEART RATE: 76 BPM | RESPIRATION RATE: 16 BRPM | DIASTOLIC BLOOD PRESSURE: 80 MMHG | HEIGHT: 75 IN | SYSTOLIC BLOOD PRESSURE: 124 MMHG | BODY MASS INDEX: 32.08 KG/M2 | WEIGHT: 258 LBS | OXYGEN SATURATION: 98 %

## 2025-05-08 DIAGNOSIS — H10.33 ACUTE BACTERIAL CONJUNCTIVITIS OF BOTH EYES: Primary | ICD-10-CM

## 2025-05-08 PROCEDURE — 99214 OFFICE O/P EST MOD 30 MIN: CPT | Performed by: NURSE PRACTITIONER

## 2025-05-08 PROCEDURE — 3074F SYST BP LT 130 MM HG: CPT | Performed by: NURSE PRACTITIONER

## 2025-05-08 PROCEDURE — 3008F BODY MASS INDEX DOCD: CPT | Performed by: NURSE PRACTITIONER

## 2025-05-08 PROCEDURE — 3079F DIAST BP 80-89 MM HG: CPT | Performed by: NURSE PRACTITIONER

## 2025-05-08 RX ORDER — POLYMYXIN B SULFATE AND TRIMETHOPRIM 1; 10000 MG/ML; [USP'U]/ML
1 SOLUTION OPHTHALMIC EVERY 4 HOURS
Qty: 10 ML | Refills: 0 | Status: SHIPPED | OUTPATIENT
Start: 2025-05-08 | End: 2025-05-13

## 2025-05-08 NOTE — PROGRESS NOTES
Chief Complaint   Patient presents with    Eye Problem     Yellow discharge and irritated for 1 week    :    HPI:   Juan Magana is a 35 year old male who presents for eye redness and purulent discharge symptoms of both eyes for  5  days . Not worse or better, no sick contact.          Current Medications[1]   Past Medical History[2]   Past Surgical History[3]   Family History[4]   Short Social Hx on File[5]      REVIEW OF SYSTEMS:   GENERAL: feels well otherwise  SKIN: no rashes  EYES:denies blurred vision or double vision, no photophobia, no pain with movement of the eyeballs.  HEENT: no cold symptoms.  LUNGS: denies shortness of breath with exertion, no cough.  CARDIOVASCULAR: denies chest pain on exertion  GI: no nausea or abdominal pain  NEURO: no headaches.      EXAM:   /80   Pulse 76   Resp 16   Ht 6' 3\" (1.905 m)   Wt 258 lb (117 kg)   SpO2 98%   BMI 32.25 kg/m²   GENERAL: well developed, well nourished,in no apparent distress  SKIN: no rashes,no suspicious lesions  EYES:PERRLA, EOMI, R and L conjunctiva injected, purulent discharge, increased tearing, retina, sclera normal.  HEENT: atraumatic, normocephalic,ears and throat are clear  NECK: supple,no adenopathy,no bruits  LUNGS: clear to auscultation  CARDIO: RRR without murmur      ASSESSMENT AND PLAN:   Juan Magana is a 35 year old male who presents with acute purulent conjunctivitis.  Diagnoses and all orders for this visit:    Acute bacterial conjunctivitis of both eyes  -     polymyxin B-trimethoprim 19308-2.1 UNIT/ML-% Ophthalmic Solution; Place 1 drop into both eyes every 4 (four) hours for 5 days.       The patient indicates understanding of these issues and agrees to the plan.  The patient is asked to return if sx's persist or worsen.          [1]   Current Outpatient Medications   Medication Sig Dispense Refill    escitalopram 10 MG Oral Tab Take 1 tablet (10 mg total) by mouth daily. 90 tablet 3    Vitamin D-Vitamin K (K2-D3  10,000) 40734-42 UNIT-MCG Oral Cap vitamin D3 250 mcg (10,000 unit)-vitamin K2 45 mcg capsule, [RxNorm: 0]      ALPRAZolam 0.5 MG Oral Tab Take 1 tablet (0.5 mg total) by mouth 2 (two) times daily as needed. 60 tablet 5    Multiple Vitamins-Minerals (MULTI-VITAMIN/MINERALS) Oral Tab Take 1 tablet by mouth daily.     [2]   Past Medical History:   Anxiety    Chlamydia contact, treated    Depression    DVT (deep venous thrombosis) (HCC)    Left leg    DVT of deep femoral vein (HCC)    Keloid scar    Mitral valve prolapse    Varicose vein   [3]   Past Surgical History:  Procedure Laterality Date    Ablation      Other      varicose removal to lt leg    Other surgical history      vein removal   [4]   Family History  Problem Relation Age of Onset    Heart Disorder Father         mitral valve repair    Lipids Father     Hypertension Mother     Other (Pancreatic Cancer) Maternal Grandmother         pancreatic    Heart Disorder Maternal Grandfather     Hypertension Maternal Grandfather     Heart Disorder Paternal Grandmother     Heart Disorder Paternal Grandfather    [5]   Social History  Socioeconomic History    Marital status: Single    Number of children: 1   Tobacco Use    Smoking status: Former     Current packs/day: 0.00     Types: Cigarettes     Quit date: 2011     Years since quittin.5     Passive exposure: Never    Smokeless tobacco: Never   Vaping Use    Vaping status: Never Used   Substance and Sexual Activity    Alcohol use: Not Currently     Alcohol/week: 1.0 - 2.0 standard drink of alcohol     Types: 1 - 2 Glasses of wine per week     Comment: Cage done 10/08/2018, 1-2 drinks/week    Drug use: Never    Sexual activity: Yes     Partners: Female   Other Topics Concern     Service No    Blood Transfusions No    Caffeine Concern No    Occupational Exposure No    Hobby Hazards No    Sleep Concern No    Stress Concern Yes    Weight Concern No    Special Diet No    Back Care No    Exercise Yes     Bike Helmet No    Seat Belt Yes    Self-Exams No

## 2025-05-13 DIAGNOSIS — H10.33 ACUTE BACTERIAL CONJUNCTIVITIS OF BOTH EYES: ICD-10-CM

## 2025-05-13 RX ORDER — POLYMYXIN B SULFATE AND TRIMETHOPRIM 1; 10000 MG/ML; [USP'U]/ML
1 SOLUTION OPHTHALMIC EVERY 4 HOURS
Qty: 10 ML | Refills: 0 | OUTPATIENT
Start: 2025-05-13 | End: 2025-05-18

## 2025-05-13 NOTE — TELEPHONE ENCOUNTER
Outpatient Medication Detail     Disp Refills Start End    polymyxin B-trimethoprim 63922-6.1 UNIT/ML-% Ophthalmic Solution 10 mL 0 5/8/2025 5/13/2025    Sig - Route: Place 1 drop into both eyes every 4 (four) hours for 5 days. - Both Eyes    Sent to pharmacy as: Polymyxin B-Trimethoprim 98101-7.1 UNIT/ML-% Ophthalmic Solution (Polytrim)    E-Prescribing Status: Receipt confirmed by pharmacy (5/8/2025 10:38 AM CDT)      Associated Diagnoses    Acute bacterial conjunctivitis of both eyes  - Primary        Pharmacy    [id number] - [place], [place] - [address] [phone], [phone]

## 2025-05-30 ENCOUNTER — OFFICE VISIT (OUTPATIENT)
Dept: FAMILY MEDICINE CLINIC | Facility: CLINIC | Age: 35
End: 2025-05-30
Payer: COMMERCIAL

## 2025-05-30 VITALS
OXYGEN SATURATION: 96 % | HEIGHT: 75 IN | WEIGHT: 254 LBS | RESPIRATION RATE: 18 BRPM | DIASTOLIC BLOOD PRESSURE: 68 MMHG | BODY MASS INDEX: 31.58 KG/M2 | SYSTOLIC BLOOD PRESSURE: 116 MMHG | HEART RATE: 73 BPM

## 2025-05-30 DIAGNOSIS — E55.9 VITAMIN D DEFICIENCY: ICD-10-CM

## 2025-05-30 DIAGNOSIS — S80.12XA CONTUSION OF LEFT LOWER LEG, INITIAL ENCOUNTER: Primary | ICD-10-CM

## 2025-05-30 DIAGNOSIS — Z00.00 LABORATORY EXAM ORDERED AS PART OF ROUTINE GENERAL MEDICAL EXAMINATION: ICD-10-CM

## 2025-05-30 PROCEDURE — 3008F BODY MASS INDEX DOCD: CPT | Performed by: FAMILY MEDICINE

## 2025-05-30 PROCEDURE — 3078F DIAST BP <80 MM HG: CPT | Performed by: FAMILY MEDICINE

## 2025-05-30 PROCEDURE — 99213 OFFICE O/P EST LOW 20 MIN: CPT | Performed by: FAMILY MEDICINE

## 2025-05-30 PROCEDURE — 3074F SYST BP LT 130 MM HG: CPT | Performed by: FAMILY MEDICINE

## 2025-05-30 NOTE — PROGRESS NOTES
The following individual(s) verbally consented to be recorded using ambient AI listening technology and understand that they can each withdraw their consent to this listening technology at any point by asking the clinician to turn off or pause the recording:    Patient name: Juan Magana  Additional names:

## 2025-05-30 NOTE — PROGRESS NOTES
Subjective:   Juan Magana is a 35 year old male who presents for Leg or Foot Injury (Left shin area, pt states he hit leg on stool at work. Now he feels a bump with no pain )     History/Other:   History of Present Illness  Juan Magana is a 35 year old male who presents with a lump on his leg after an injury at work.    Approximately one month ago, he sustained an injury to his leg at work, resulting in the discovery of a hard, movable lump. The lump is not associated with pain, numbness, or tingling, and there have been no changes in its size or expansion. He is able to walk without difficulty.    He has a history of varicose veins, which raises his concern about potential blood clots, although he has not observed any significant bruising or bleeding in the area of the lump.    No pain, numbness, tingling, changes in size of the lump, or difficulty walking.   Chief Complaint Reviewed and Verified  Nursing Notes Reviewed and   Verified  Allergies Reviewed  Medications Reviewed         Tobacco:  He smoked tobacco in the past but quit greater than 12 months ago.  Tobacco Use[1]     Current Medications[2]    PHQ-2 SCORE: 0  , done 5/30/2025   If you checked off any problems, how difficult have these problems made it for you to do your work, take care of things at home, or get along with other people?: Not difficult at all      Review of Systems:  Pertinent items are noted in HPI.    Objective:   /68   Pulse 73   Resp 18   Ht 6' 3\" (1.905 m)   Wt 254 lb (115.2 kg)   SpO2 96%   BMI 31.75 kg/m²  Estimated body mass index is 31.75 kg/m² as calculated from the following:    Height as of this encounter: 6' 3\" (1.905 m).    Weight as of this encounter: 254 lb (115.2 kg).   Physical Exam  GENERAL: Alert, cooperative, well developed, no acute distress.  EXTREMITIES: No cyanosis or edema. Palpable lump on right anterior leg, non-tender, soft mobile. No ecchymosis visualized   NEUROLOGICAL: Cranial nerves  grossly intact, moves all extremities without gross motor or sensory deficit.      Assessment & Plan:   1. Contusion of left lower leg, initial encounter (Primary)  2. Laboratory exam ordered as part of routine general medical examination  -     CBC With Differential With Platelet; Future; Expected date: 2025  -     Comp Metabolic Panel (14); Future; Expected date: 2025  -     Lipid Panel; Future; Expected date: 2025  -     TSH W Reflex To Free T4; Future; Expected date: 2025  3. Vitamin D deficiency  -     Vitamin D; Future; Expected date: 2025    Assessment & Plan  Contusion of leg  Contusion likely from trauma. Asymptomatic. No signs of compartment syndrome. Provided reassurance, should improve with time.   - Advise icing to reduce swelling.  - Recommend compression with Ace wrap.  - Monitor for changes in size or symptoms.  - Consider ultrasound if no improvement or worsening.  - F/u prn          Return in about 6 months (around 2025) for routine physical.        Greg Henderson MD, 2025, 12:35 PM             [1]   Social History  Tobacco Use   Smoking Status Former    Current packs/day: 0.00    Types: Cigarettes    Quit date: 2011    Years since quittin.5    Passive exposure: Never   Smokeless Tobacco Never   [2]   Current Outpatient Medications   Medication Sig Dispense Refill    escitalopram 10 MG Oral Tab Take 1 tablet (10 mg total) by mouth daily. 90 tablet 3    Vitamin D-Vitamin K (K2-D3 10,000) 77101-04 UNIT-MCG Oral Cap vitamin D3 250 mcg (10,000 unit)-vitamin K2 45 mcg capsule, [RxNorm: 0]      ALPRAZolam 0.5 MG Oral Tab Take 1 tablet (0.5 mg total) by mouth 2 (two) times daily as needed. 60 tablet 5    Multiple Vitamins-Minerals (MULTI-VITAMIN/MINERALS) Oral Tab Take 1 tablet by mouth daily.

## (undated) NOTE — Clinical Note
Date: 3/18/2017    Patient Name: Brendan Medley          To Whom it may concern:      Mr. Janene Mcgee may return to work without restrictions on 3/18/17.          Sincerely,    Griselda Coles MD

## (undated) NOTE — LETTER
Date & Time: 9/25/2023, 8:05 AM  Patient: Clary Black      To Whom It May Concern:    Alyssia Fields was seen and treated in our department on 9/25/2023. He can return to work with these limitations: may return on Wed 9/27/2023 if pain is improved an pain not made worse with repetitive movements.     If you have any questions or concerns, please do not hesitate to call.          _____________________________  Physician/APC Signature

## (undated) NOTE — LETTER
04/05/18        [address] [facility]      Dear Bam Han,    Our records indicate that you have outstanding lab work and or testing that was ordered for you and has not yet been completed:          CBC, Platelet;  No Differ

## (undated) NOTE — ED AVS SNAPSHOT
[facility] Emergency Department in [address]    Phone:  [phone]    Fax:  [phone]           Jh Burnett   MRN: [id number]    Department:  [facility] Emergency Department in [place]   Date of Visit: CONCUSSION (ENGLISH)      Disclosure     Insurance plans vary and the physician(s) referred by the ER may not be covered by your plan. Please contact your insurance company to determine coverage for follow-up care and referrals.     Lisha Araujo prescription right away and begin taking the medication(s) as directed    If the emergency physician has read X-rays, these will be re-interpreted by a radiologist.  If there is a significant change in your reading, you will be contacted.  Please make sure Medicaid plans. To get signed up and covered, please call [phone] and ask to get set up for an insurance coverage that is in-network with [place] [address].         Imaging Results         CT BRAIN OR HEAD ([id number]) (Final result) Result time:  0 Summaries. If you've been to the Emergency Department or your doctor's office, you can view your past visit information in [employer] by going to Visits < Visit Summaries. [employer] questions? Call [phone] for help.   [employer] is NOT to be used for urge

## (undated) NOTE — LETTER
Date: 3/2/2022    Patient Name: Narda Maldonado          To Whom it may concern: This letter has been written at the patient's request. The above patient was seen at the [facility] for treatment of a medical condition. I had advised him to stay at home for the rest of this week in order for him to recover. He may go back next Monday, 3/7/2022 and when symptoms resolve. Thank you for your patience.         Sincerely,    Savannah Hoyos MD

## (undated) NOTE — MR AVS SNAPSHOT
[address] [place] [address] 700 [facility] [place]  [address] [phone]  [phone]               Thank you for choosing us for your health care visit with Rupa Mckeon DO.   We are glad to serve you and happy to provide you wi reaction. When anxiety becomes more severe, it can interfere with daily life. In some cases, you may not even be aware of what it is you’re anxious about. There may also be a genetic link or it may be a learned behavior in the home.   Both psychological and ¨ Relaxation  ¨ Breathing exercises  ¨ Visualization  ¨ Biofeedback  ¨ Meditation  For more information about this, consult your doctor or go to a local bookstore and review the many books and tapes available on this subject.   Follow-up care  If you feel t effects of something you can't control. For example, anxiety is a normal response to situations that might damage your body, separate you from a loved one, or lose your job. The symptoms of anxiety can be physical and mental.  How does it feel?   At certain · Exercise—it’s a great way to relieve tension and help your body feel relaxed. · Avoid caffeine and nicotine, which can make anxiety symptoms worse. · Fight the temptation to turn to alcohol or unprescribed drugs for relief.  They only make things worse Summaries. If you've been to the Emergency Department or your doctor's office, you can view your past visit information in [facility] by going to Visits < Visit Summaries. Wallit questions? Call [phone] for help.   [facility] is NOT to be used for urge

## (undated) NOTE — LETTER
18    Patient: Sallyanne Soulier  : 1990 Visit date: 1/15/2018    Dear  Dr. Sherice Parr, DO,    Thank you for referring Sallyanne Soulier to my practice. Please find my assessment and plan below.                 Assessment   Strain of groin, left, init Bilateral testes are without palpable abnormality. At today's office visit I reassured the patient and his father that there is no appreciable hernia on examination. The patient likely has a groin strain secondary to his increase in workout intensity.

## (undated) NOTE — LETTER
Date: 2/8/2019    Patient Name: Meri June          To Whom it may concern: The above patient was seen at the [facility] for treatment of a medical condition. This patient should be excused from attending work 2/8/19.     The patient m

## (undated) NOTE — LETTER
September 22, 2023    Patient: Hamzah Cee   Date of Visit: 9/21/2023       To Whom It May Concern:    Adebayo Patel was seen and treated in our emergency department on 9/21/2023. He should not return to work until 09/23/2023 . If you have any questions or concerns, please don't hesitate to call.        Encounter Provider(s):    Denisse Johnson MD

## (undated) NOTE — LETTER
Date: 10/9/2018    Patient Name: Natasha Lopez          To Whom it may concern: This letter has been written at the patient's request. The above patient was seen at the [facility] for treatment of a medical condition.     The patient may r

## (undated) NOTE — LETTER
[facility] IN [address] [address]  Dept: [phone]  Dept Fax: [phone]         June 21, 2020    Patient: Moi Oquendo   YOB: 1990   Date of Visit: 6/21/2020       To Whom It May Concern:    Shahid Gary

## (undated) NOTE — LETTER
Date: 12/5/2022    Patient Name: Mauri Alba      To Whom it may concern: This letter has been written at the patient's request. The above patient was seen at the [facility] for treatment of a medical condition. This patient should be excused from attending work until fever free for 24 hours with no fever reducer and respiratory symptoms are mostly resolved per patient report. The patient is expected to return to work on 12/7/22 with no limitations.             Sincerely,        ALEX Vasques

## (undated) NOTE — ED AVS SNAPSHOT
[facility] in [address] [place] [address]    Phone:  [phone]    Fax:  [phone]           Cristian Hammer   MRN: [id number]    Department:  [facility] in [place]   Date of Visit: IF THERE IS ANY CHANGE OR WORSENING OF YOUR CONDITION, CALL YOUR PRIMARY CARE PHYSICIAN AT ONCE OR RETURN IMMEDIATELY TO THE EMERGENCY DEPARTMENT.     If you have been prescribed any medication(s), please fill your prescription right away and begin taking t

## (undated) NOTE — ED AVS SNAPSHOT
THE [facility] Emergency Department in [address] [facility]    Phone:  [phone]    Fax:  [phone]           Cristian Hammer   MRN: [id number]    Department:  THE [facility] Emergency Department in [place]   Date of Visit: Follow-up with your primary care physician for repeat CT of your chest in 6 months due to the pulmonary nodule.     Discharge References/Attachments     CHEST PAIN, NONCARDIAC  (ENGLISH)      Disclosure     Insurance plans vary and the physician(s) referred CARE PHYSICIAN AT ONCE OR RETURN IMMEDIATELY TO THE EMERGENCY DEPARTMENT.     If you have been prescribed any medication(s), please fill your prescription right away and begin taking the medication(s) as directed    If the emergency physician has read X-ray coverage. Patient [address] is a Federal Navigator program that can help with your Affordable Care Act coverage, as well as all types of Medicaid plans.   To get signed up and covered, please call [phone] and ask to get set up for an insuran 6 months to reevaluate may be done.               Dictated by: Nir Marquez MD on 4/12/2017 at 20:32       Approved by: Nir Marquez MD                    MyChart     Visit MyChart  You can access your MyChart to more actively manage your health c

## (undated) NOTE — LETTER
Date: 3/4/2020    Patient Name: Beth Mcdaniel          To Whom it may concern:    Please excuse Bam Han from work for 3/4/20 - 3/6/20. He may return on 3/9/20.          Sincerely,    Stephie Owens MD

## (undated) NOTE — LETTER
Date: 11/8/2017    Patient Name: Claudia Bermudez          To Whom it may concern: This letter has been written at the patient's request. The above patient was seen at the [facility] for treatment of a medical condition.     This patient shou

## (undated) NOTE — MR AVS SNAPSHOT
[facility] [address] [place] [address] 700 [facility]  [address] Russell [phone]               Thank you for choosing us for your health care visit with Vicki Bermudez DO.   We are glad to serve you and happy to provide you wi To schedule an appointment for your radiology test please call [address] [facility]   at [phone].          Referral Details     Referred By    Referred To    DO Tho Harrell schedule your appointment. Failure to obtain required authorization numbers can create reimbursement difficulties for you.     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 Your labs are all normal. See  for cardiology overview. See Dr. Kika Bradley for the hemorrhoids internal evaluation. See Dr. Forrest Turner as well as scheduled.    Take care, Dr. Shaneka Ken    Deep Vein Thrombosis (DVT)    Deep vein th · Follow your provider’s instructions about activity and rest.  · If support or compression stockings are prescribed, wear them as directed. These may help improve blood flow in the legs.   · When sitting or lying down, move your ankles, toes and knees ofte from the vein and travel to the lungs. This is called a pulmonary embolism. This can cut off the flow of blood to the lung. It is a medical emergency and may cause death.   Over time a blood clot can also harm veins.  To protect your health, blood clots mus the leg helps prevent blood from pooling and forming blood clots.   · When you are sitting or lying down, put your legs up on a pillow. Move your ankle and toes to help the blood flow. · You may need medicine to help relieve pain.  Your healthcare provider [address]. All rights reserved. This information is not intended as a substitute for professional medical care. Always follow your healthcare professional's instructions.              Allergies as of May 15, 2017     Erythromycin     Nauseau, GI discomfort

## (undated) NOTE — ED AVS SNAPSHOT
THE [facility] Emergency Department in [address] [facility]    Phone:  [phone]    Fax:  [phone]           Duane Floro   MRN: [id number]    Department:  THE [facility] Emergency Department in [place]   Date of Visit: IF THERE IS ANY CHANGE OR WORSENING OF YOUR CONDITION, CALL YOUR PRIMARY CARE PHYSICIAN AT ONCE OR RETURN IMMEDIATELY TO THE EMERGENCY DEPARTMENT.     If you have been prescribed any medication(s), please fill your prescription right away and begin taking t

## (undated) NOTE — LETTER
April 12, 2017    Patient: Aranza Baxter   Date of Visit: 4/12/2017       To Whom It May Concern:    Luis Armando Potts was seen and treated in our emergency department on 4/12/2017.  Please excuse from work until cleared by md     If you have any questions

## (undated) NOTE — MR AVS SNAPSHOT
[facility] [address] [place] [address] 700 [facility]  [phone]               Thank you for choosing us for your health care visit with Tara aCpps DO.   We are glad to serve you and happy to provide you wi requirements for authorization, please wait 5-7 days and then contact your physician's office. At that time, you will be provided with any authorization numbers or be assured that none are required. You can then schedule your appointment.  Failure to obtain · Pregnancy, some swelling is normal, but a sudden increase in leg swelling or weight gain can be a sign of a dangerous complication of pregnancy  · Poor nutrition  · Thyroid disease  Medical treatment will depend on what is causing the swelling in your le [address]. All rights reserved. This information is not intended as a substitute for professional medical care. Always follow your healthcare professional's instructions.         Varicose Veins    Varicose veins are swollen, enlarged veins most often found in t heart. Sit or lie with your feet above heart level a few times throughout the day, or as directed. · Avoid long periods of sitting or standing. Change positions often. Also, move your ankles, toes and knees often. This may also help improve blood flow.   · HYPERCARE 20 % Soln   Generic drug:  Aluminum Chloride   1 Application nightly. MULTI-VITAMIN/MINERALS Tabs   Take 1 tablet by mouth daily.                    MyChart     Visit MyChart  You can access your MyChart to more actively manage y

## (undated) NOTE — LETTER
Date: 5/8/2025    Patient Name: Juan Magana          To Whom it may concern:     The above patient was seen at [facility] for treatment of a medical condition.    This patient should be excused from attending work/school.    The patient may return to work/school on 5/9/2025 no restrictions.        Sincerely,    ALEX Frakn

## (undated) NOTE — LETTER
Date: 11/8/2017    Patient Name: Hamida Hunt          To Whom it may concern: This letter has been written at the patient's request. The above patient was seen at the [facility] for treatment of a medical condition.     This patient socorrou

## (undated) NOTE — ED AVS SNAPSHOT
Geeta Pop   MRN: [id number]    Department:  THE [facility] Emergency Department in [place]   Date of Visit:  12/11/2017           Disclosure     Insurance plans vary and the physician(s) referred by the ER may not be covered by your plan.  Please contac tell this physician (or your personal doctor if your instructions are to return to your personal doctor) about any new or lasting problems. The primary care or specialist physician will see patients referred from the [facility] Emergency Department.  Arthor Bernheim

## (undated) NOTE — MR AVS SNAPSHOT
[facility] [address] [place] [address] 700 [facility]  [address] [phone]               Thank you for choosing us for your health care visit with Khalif Gordon MD.  We are glad to serve you and happy to provide you wit Commonly known as:  [address] can access your MyChart to more actively manage your health care and view more details from this visit by going to [email or web address].   If you've recently had a stay